# Patient Record
Sex: MALE | Race: BLACK OR AFRICAN AMERICAN | NOT HISPANIC OR LATINO | Employment: FULL TIME | URBAN - METROPOLITAN AREA
[De-identification: names, ages, dates, MRNs, and addresses within clinical notes are randomized per-mention and may not be internally consistent; named-entity substitution may affect disease eponyms.]

---

## 2017-02-03 ENCOUNTER — OFFICE VISIT (OUTPATIENT)
Dept: FAMILY MEDICINE | Facility: CLINIC | Age: 37
End: 2017-02-03
Payer: COMMERCIAL

## 2017-02-03 VITALS
OXYGEN SATURATION: 98 % | HEIGHT: 74 IN | TEMPERATURE: 98 F | DIASTOLIC BLOOD PRESSURE: 80 MMHG | HEART RATE: 90 BPM | BODY MASS INDEX: 34.57 KG/M2 | SYSTOLIC BLOOD PRESSURE: 120 MMHG | WEIGHT: 269.38 LBS

## 2017-02-03 DIAGNOSIS — E66.9 NON MORBID OBESITY, UNSPECIFIED OBESITY TYPE: ICD-10-CM

## 2017-02-03 DIAGNOSIS — Z00.00 ANNUAL PHYSICAL EXAM: Primary | ICD-10-CM

## 2017-02-03 DIAGNOSIS — K21.9 GASTROESOPHAGEAL REFLUX DISEASE, ESOPHAGITIS PRESENCE NOT SPECIFIED: ICD-10-CM

## 2017-02-03 PROCEDURE — 99214 OFFICE O/P EST MOD 30 MIN: CPT | Mod: S$GLB,,, | Performed by: FAMILY MEDICINE

## 2017-02-03 PROCEDURE — 99999 PR PBB SHADOW E&M-EST. PATIENT-LVL III: CPT | Mod: PBBFAC,,, | Performed by: FAMILY MEDICINE

## 2017-02-03 RX ORDER — ESOMEPRAZOLE MAGNESIUM 40 MG/1
40 CAPSULE, DELAYED RELEASE ORAL DAILY
Qty: 90 CAPSULE | Refills: 1 | Status: SHIPPED | OUTPATIENT
Start: 2017-02-03 | End: 2019-05-28

## 2017-02-03 NOTE — MR AVS SNAPSHOT
[facility]  [address] [place] [address]  Phone: [phone]  Fax: [phone]                  Dewey Melendez   2/3/2017 3:00 PM   Office Visit    Description:  Male : 1980   Provider:  Bettina Castro MD   Department:  [facility] Medicine           Reason for Visit     Abdominal Pain           Diagnoses this Visit        Comments    Gastroesophageal reflux disease, esophagitis presence not specified    -  Primary     Annual physical exam                To Do List           Future Appointments        Provider Department Dept Phone    2/10/2017 3:30 PM LAB, [place] [facility] [phone]      Goals (5 Years of Data)     None       These Medications        Disp Refills Start End    esomeprazole (NEXIUM) 40 MG capsule 90 capsule 1 2/3/2017 2017    Take 1 capsule (40 mg total) by mouth once daily. - Oral    Pharmacy: [facility] [address] [place], [place] [address]Y AT [facility] #: [phone]         [facility] Call     [facility] Nurse Care Line -  Assistance  Registered nurses in the [facility] provide clinical advisement, health education, appointment booking, and other advisory services.  Call for this free service at [phone].             Medications           Message regarding Medications     Verify the changes and/or additions to your medication regime listed below are the same as discussed with your clinician today.  If any of these changes or additions are incorrect, please notify your healthcare provider.             Verify that the below list of medications is an accurate representation of the medications you are currently taking.  If none reported, the list may be blank. If incorrect, please contact your healthcare provider. Carry this list with you in case of emergency.           Current Medications     aspirin (ECOTRIN) 81 MG EC tablet Take 81 mg by mouth once  "daily.      esomeprazole (NEXIUM) 40 MG capsule Take 1 capsule (40 mg total) by mouth once daily.           Clinical Reference Information           Your Vitals Were     BP Pulse Temp Height Weight SpO2    120/80 (BP Location: Right arm, Patient Position: Sitting, BP Method: Manual) 90 98 °F (36.7 °C) (Oral) 6' 2" (1.88 m) 122.2 kg (269 lb 6.4 oz) 98%    BMI                34.59 kg/m2          Blood Pressure          Most Recent Value    BP  120/80      Allergies as of 2/3/2017     No Known Allergies      Immunizations Administered on Date of Encounter - 2/3/2017     None      Orders Placed During Today's Visit     Future Labs/Procedures Expected by Expires    CBC auto differential  2/3/2017 2/3/2018    Comprehensive metabolic panel  2/3/2017 2/3/2018    H. pylori antigen, stool  2/3/2017 2/3/2018    Hemoglobin A1c  2/3/2017 2/3/2018    Lipid panel  2/3/2017 2/3/2018    TSH  2/3/2017 2/3/2018      [facility] Sign-Up     Activating your [facility] account is as easy as 1-2-3!     1) Visit [email or web address], select Sign Up Now, enter this activation code and your date of birth, then select Next.  [email or web address]  Expires: 3/20/2017  3:40 PM      2) Create a username and password to use when you visit [facility] in the future and select a security question in case you lose your password and select Next.    3) Enter your e-mail address and click Sign Up!    Additional Information  If you have questions, please e-mail [email or web address] or call [phone] to talk to our [facility] staff. Remember, [facility] is NOT to be used for urgent needs. For medical emergencies, dial 911.         Instructions      GERD (Adult)    The esophagus is a tube that carries food from the mouth to the stomach. A valve at the lower end of the esophagus prevents stomach acid from flowing upward. When this valve doesn't work properly, stomach contents may repeatedly flow back up (reflux) into the esophagus. This is called gastroesophageal " "reflux disease (GERD). GERD can irritate the esophagus. It can cause problems with swallowing or breathing. In severe cases, GERD can cause recurrent pneumonia or other serious problems.  Symptoms of reflux include burning, pressure or sharp pain in the upper abdomen or mid to lower chest. The pain can spread to the neck, back, or shoulder. There may be belching, an acid taste in the back of the throat, chronic cough, or sore throat or hoarseness. GERD symptoms often occur during the day after a big meal. They can also occur at night when lying down.   Home care  Lifestyle changes can help reduce symptoms. If needed, medicines may be prescribed. Symptoms often improve with treatment, but if treatment is stopped, the symptoms often return after a few months. So most persons with GERD will need to continue treatment.  Lifestyle changes  · Limit or avoid fatty, fried, and spicy foods, as well as coffee, chocolate, mint, and foods with high acid content such as tomatoes and citrus fruit and juices (orange, grapefruit, lemon).  · Dont eat large meals, especially at night. Frequent, smaller meals are best. Do not lie down right after eating. And dont eat anything 3 hours before going to bed.  · Avoid drinking alcohol and smoking. As much as possible, stay away from second hand smoke.  · If you are overweight, losing weight will reduce symptoms.   · Avoid wearing tight clothing around your stomach area.  · If your symptoms occur during sleep, use a foam wedge to elevate your upper body (not just your head.) Or, place 4" blocks under the head of your bed.  Medicines  If needed, medicines can help relieve the symptoms of GERD and prevent damage to the esophagus. Discuss a medicine plan with your healthcare provider. This may include one or more of the following medicines:  · Antacids to help neutralize the normal acids in your stomach.  · Acid blockers (H2 blockers) to decrease acid production.  · Acid inhibitors (PPIs) to " decrease acid production in a different way than the blockers. They may work better, but can take a little longer to take effect.  Take an antacid 30-60 minutes after eating and at bedtime, but not at the same time as an acid blocker.  Try not to take medicines such as ibuprofen and aspirin. If you are taking aspirin for your heart or other medical reasons, talk to your healthcare provider about stopping it.  Follow-up care  Follow up with your healthcare provider or as advised by our staff.  When to seek medical advice  Call your healthcare provider if any of the following occur:  · Stomach pain gets worse or moves to the lower right abdomen (appendix area)  · Chest pain appears or gets worse, or spreads to the back, neck, shoulder, or arm  · Frequent vomiting (cant keep down liquids)  · Blood in the stool or vomit (red or black in color)  · Feeling weak or dizzy  · Fever of 100.4ºF (38ºC) or higher, or as directed by your healthcare provider  Date Last Reviewed: 6/23/2015 © 2000-2016 [employer]. [address] [place] [address]. All rights reserved. This information is not intended as a substitute for professional medical care. Always follow your healthcare professional's instructions.             Language Assistance Services     ATTENTION: Language assistance services are available, free of charge. Please call [phone].      ATENCIÓN: Si habla chidi, tiene a head disposición servicios gratuitos de asistencia lingüística. Llame al [phone].     [facility] Ý: N?u b?n nói Ti?ng Vi?t, có các d?ch v? h? tr? ngôn ng? mi?n phí dành cho b?n. G?i s? [phone].         [facility] complies with applicable Federal civil rights laws and does not discriminate on the basis of race, color, national origin, age, disability, or sex.

## 2017-02-03 NOTE — PROGRESS NOTES
Routine Office Visit    Patient Name: Dewey Melendez    : 1980  MRN: [id number]    Subjective:  Dewey Munguia is a 36 y.o. male who presents today for     1. Abdominal pain - started a few weeks ago - pain is epigastric and described as a burning sensation. Pt states that pain is worse after eating and aggravated with spicy foods. Pt does have a history of reflux. He has not been taking his ppi. Symptoms last 30mins to an hour.     Review of Systems   Constitutional: Negative for chills and fever.   HENT: Negative for congestion.    Eyes: Negative for blurred vision.   Respiratory: Negative for cough.    Cardiovascular: Negative for chest pain.   Gastrointestinal: Negative for abdominal pain, constipation, diarrhea, heartburn, nausea and vomiting.   Genitourinary: Negative for dysuria.   Musculoskeletal: Negative for myalgias.   Skin: Negative for itching and rash.   Neurological: Negative for dizziness and headaches.   Psychiatric/Behavioral: Negative for depression.       Active Problem List  Patient Active Problem List   Diagnosis    Obesity    Other abnormal glucose    Family history of diabetes mellitus    Dyslipidemia with elevated low density lipoprotein cholesterol and abnormally low high density lipoprotein cholesterol    Viral URI    HDL deficiency    Leukocytosis    Family history of ischemic heart disease    Prediabetes       Past Surgical History  Past Surgical History   Procedure Laterality Date    No past surgeries         Family History  Family History   Problem Relation Age of Onset    Diabetes Mother        Social History  Social History     Social History    Marital status: Single     Spouse name: N/A    Number of children: N/A    Years of education: N/A     Occupational History    Not on file.     Social History Main Topics    Smoking status: Never Smoker    Smokeless tobacco: Never Used    Alcohol use Yes      Comment: Very seldom beer or mixed drink.    Drug use:  "No    Sexual activity: Not on file     Other Topics Concern    Not on file     Social History Narrative       Medications and Allergies  Reviewed and updated.       Physical Exam  Visit Vitals    /80 (BP Location: Right arm, Patient Position: Sitting, BP Method: Manual)    Pulse 90    Temp 98 °F (36.7 °C) (Oral)    Ht 6' 2" (1.88 m)    Wt 122.2 kg (269 lb 6.4 oz)    SpO2 98%    BMI 34.59 kg/m2     Physical Exam   Constitutional: He is oriented to person, place, and time. He appears well-developed and well-nourished.   HENT:   Head: Normocephalic and atraumatic.   Eyes: Conjunctivae and EOM are normal. Pupils are equal, round, and reactive to light.   Neck: Normal range of motion. Neck supple. No JVD present. No thyromegaly present.   Cardiovascular: Normal rate, regular rhythm and normal heart sounds.    Pulmonary/Chest: Effort normal and breath sounds normal. He has no wheezes.   Abdominal: Soft. Bowel sounds are normal. He exhibits no distension. There is no tenderness. There is no guarding.   Musculoskeletal: Normal range of motion.   Lymphadenopathy:     He has no cervical adenopathy.   Neurological: He is alert and oriented to person, place, and time.   Skin: Skin is warm and dry.   Psychiatric: He has a normal mood and affect. His behavior is normal.         Assessment/Plan:  Dewey Melendez is a 36 y.o. male who presents today for :    Annual physical exam  -     Comprehensive metabolic panel; Future; Expected date: 2/3/17  -     Lipid panel; Future; Expected date: 2/3/17  -     Hemoglobin A1c; Future; Expected date: 2/3/17  -     CBC auto differential; Future; Expected date: 2/3/17  -     TSH; Future; Expected date: 2/3/17    Gastroesophageal reflux disease, esophagitis presence not specified  -     H. pylori antigen, stool; Future; Expected date: 2/3/17  -     esomeprazole (NEXIUM) 40 MG capsule; Take 1 capsule (40 mg total) by mouth once daily.  Dispense: 90 capsule; Refill: " 1  Recommend to eat bland foods   Avoid spicy foods  Avoid laying down right away.     Non morbid obesity, unspecified obesity type  The patient is asked to make an attempt to improve diet and exercise patterns to aid in medical management of this problem.        Return if symptoms worsen or fail to improve.

## 2017-02-03 NOTE — PATIENT INSTRUCTIONS

## 2017-02-10 ENCOUNTER — LAB VISIT (OUTPATIENT)
Dept: LAB | Facility: HOSPITAL | Age: 37
End: 2017-02-10
Attending: FAMILY MEDICINE
Payer: COMMERCIAL

## 2017-02-10 DIAGNOSIS — Z00.00 ANNUAL PHYSICAL EXAM: ICD-10-CM

## 2017-02-10 LAB
ALBUMIN SERPL BCP-MCNC: 3.7 G/DL
ALP SERPL-CCNC: 64 U/L
ALT SERPL W/O P-5'-P-CCNC: 27 U/L
ANION GAP SERPL CALC-SCNC: 6 MMOL/L
AST SERPL-CCNC: 26 U/L
BASOPHILS # BLD AUTO: 0.04 K/UL
BASOPHILS NFR BLD: 0.3 %
BILIRUB SERPL-MCNC: 1.1 MG/DL
BUN SERPL-MCNC: 13 MG/DL
CALCIUM SERPL-MCNC: 9.1 MG/DL
CHLORIDE SERPL-SCNC: 106 MMOL/L
CHOLEST/HDLC SERPL: 5.5 {RATIO}
CO2 SERPL-SCNC: 27 MMOL/L
CREAT SERPL-MCNC: 1.2 MG/DL
DIFFERENTIAL METHOD: ABNORMAL
EOSINOPHIL # BLD AUTO: 0.2 K/UL
EOSINOPHIL NFR BLD: 1.8 %
ERYTHROCYTE [DISTWIDTH] IN BLOOD BY AUTOMATED COUNT: 13.5 %
EST. GFR  (AFRICAN AMERICAN): >60 ML/MIN/1.73 M^2
EST. GFR  (NON AFRICAN AMERICAN): >60 ML/MIN/1.73 M^2
GLUCOSE SERPL-MCNC: 114 MG/DL
HCT VFR BLD AUTO: 41.1 %
HDL/CHOLESTEROL RATIO: 18 %
HDLC SERPL-MCNC: 194 MG/DL
HDLC SERPL-MCNC: 35 MG/DL
HGB BLD-MCNC: 13.9 G/DL
LDLC SERPL CALC-MCNC: 109.4 MG/DL
LYMPHOCYTES # BLD AUTO: 4.1 K/UL
LYMPHOCYTES NFR BLD: 33.9 %
MCH RBC QN AUTO: 29.4 PG
MCHC RBC AUTO-ENTMCNC: 33.8 %
MCV RBC AUTO: 87 FL
MONOCYTES # BLD AUTO: 0.7 K/UL
MONOCYTES NFR BLD: 5.4 %
NEUTROPHILS # BLD AUTO: 7.1 K/UL
NEUTROPHILS NFR BLD: 58.4 %
NONHDLC SERPL-MCNC: 159 MG/DL
PLATELET # BLD AUTO: 230 K/UL
PMV BLD AUTO: 11.8 FL
POTASSIUM SERPL-SCNC: 3.9 MMOL/L
PROT SERPL-MCNC: 6.9 G/DL
RBC # BLD AUTO: 4.72 M/UL
SODIUM SERPL-SCNC: 139 MMOL/L
TRIGL SERPL-MCNC: 248 MG/DL
TSH SERPL DL<=0.005 MIU/L-ACNC: 1.51 UIU/ML
WBC # BLD AUTO: 12.21 K/UL

## 2017-02-10 PROCEDURE — 84443 ASSAY THYROID STIM HORMONE: CPT

## 2017-02-10 PROCEDURE — 83036 HEMOGLOBIN GLYCOSYLATED A1C: CPT

## 2017-02-10 PROCEDURE — 85025 COMPLETE CBC W/AUTO DIFF WBC: CPT

## 2017-02-10 PROCEDURE — 80061 LIPID PANEL: CPT

## 2017-02-10 PROCEDURE — 80053 COMPREHEN METABOLIC PANEL: CPT

## 2017-02-10 PROCEDURE — 36415 COLL VENOUS BLD VENIPUNCTURE: CPT | Mod: PO

## 2017-02-11 LAB
ESTIMATED AVG GLUCOSE: 126 MG/DL
HBA1C MFR BLD HPLC: 6 %

## 2017-02-13 ENCOUNTER — TELEPHONE (OUTPATIENT)
Dept: FAMILY MEDICINE | Facility: CLINIC | Age: 37
End: 2017-02-13

## 2017-02-13 NOTE — TELEPHONE ENCOUNTER
Patient notified of his most recent lab results and Dr. Castro's notes from 2/13/2017 and he verbalized understanding.  He asked about seeing his results online and he was given the activation code from his most recent AVS to establish a [employer] account.  Informed that his result notes would be mailed to him and advised him to contact this office for any further questions or concerns.  Verbalized understanding.

## 2017-02-13 NOTE — TELEPHONE ENCOUNTER
----- Message from Bettina Castro MD sent at 2/13/2017  3:50 PM CST -----  You have pre-diabetes. The number is stable. You do not need medication, but should start exercising and making life style modifications to decrease processed carbohydrates (rice, flour, pasta, bread)  Your triglycerides (a type of fat) were elevated. I recommend a low fat diet and regular exercise. I also recommend over the counter omega 3 fish oil daily (~1gm/day)  Normal thyroid, liver and kidney

## 2017-02-15 ENCOUNTER — PATIENT MESSAGE (OUTPATIENT)
Dept: FAMILY MEDICINE | Facility: CLINIC | Age: 37
End: 2017-02-15

## 2017-02-15 ENCOUNTER — TELEPHONE (OUTPATIENT)
Dept: FAMILY MEDICINE | Facility: CLINIC | Age: 37
End: 2017-02-15

## 2017-05-11 ENCOUNTER — OFFICE VISIT (OUTPATIENT)
Dept: FAMILY MEDICINE | Facility: CLINIC | Age: 37
End: 2017-05-11
Payer: COMMERCIAL

## 2017-05-11 VITALS
OXYGEN SATURATION: 97 % | BODY MASS INDEX: 34.03 KG/M2 | HEART RATE: 86 BPM | HEIGHT: 74 IN | SYSTOLIC BLOOD PRESSURE: 120 MMHG | DIASTOLIC BLOOD PRESSURE: 80 MMHG | WEIGHT: 265.19 LBS | TEMPERATURE: 98 F

## 2017-05-11 DIAGNOSIS — S46.912D MUSCLE STRAIN OF LEFT UPPER ARM, SUBSEQUENT ENCOUNTER: Primary | ICD-10-CM

## 2017-05-11 DIAGNOSIS — E78.5 HYPERLIPIDEMIA, UNSPECIFIED HYPERLIPIDEMIA TYPE: ICD-10-CM

## 2017-05-11 DIAGNOSIS — R73.03 PREDIABETES: ICD-10-CM

## 2017-05-11 PROCEDURE — 99214 OFFICE O/P EST MOD 30 MIN: CPT | Mod: S$GLB,,, | Performed by: FAMILY MEDICINE

## 2017-05-11 PROCEDURE — 1160F RVW MEDS BY RX/DR IN RCRD: CPT | Mod: S$GLB,,, | Performed by: FAMILY MEDICINE

## 2017-05-11 PROCEDURE — 99999 PR PBB SHADOW E&M-EST. PATIENT-LVL III: CPT | Mod: PBBFAC,,, | Performed by: FAMILY MEDICINE

## 2017-05-11 RX ORDER — IBUPROFEN 600 MG/1
600 TABLET ORAL 3 TIMES DAILY
Qty: 60 TABLET | Refills: 0 | Status: SHIPPED | OUTPATIENT
Start: 2017-05-11 | End: 2020-01-23

## 2017-05-11 RX ORDER — CYCLOBENZAPRINE HCL 5 MG
5 TABLET ORAL 3 TIMES DAILY PRN
Qty: 40 TABLET | Refills: 0 | Status: SHIPPED | OUTPATIENT
Start: 2017-05-11 | End: 2017-05-21

## 2017-05-11 NOTE — PATIENT INSTRUCTIONS
Muscle Strain in the Extremities  A muscle strain is a stretching and tearing of muscle fibers. This causes pain, especially when you move that muscle. There may also be some swelling and bruising.  Home care  · Keep the hurt area raised to reduce pain and swelling. This is especially important during the first 48 hours.  · Apply an ice pack over the injured area for 15 to 20 minutes every 3 to 6 hours. You should do this for the first 24 to 48 hours. You can make an ice pack by filling a plastic bag that seals at the top with ice cubes and then wrapping it with a thin towel. Be careful not to injure your skin with the ice treatments. Ice should never be applied directly to skin. Continue the use of ice packs for relief of pain and swelling as needed. After 48 hours, apply heat (warm shower or warm bath) for 15 to 20 minutes several times a day, or alternate ice and heat.  · You may use over-the-counter pain medicine to control pain, unless another medicine was prescribed. If you have chronic liver or kidney disease or ever had a stomach ulcer or GI bleeding, talk with your healthcare provider before using these medicines.  · For leg strains: If crutches have been recommended, dont put full weight on the hurt leg until you can do so without pain. You can return to sports when you are able to hop and run on the injured leg without pain.  Follow-up care  Follow up with your healthcare provider, or as advised.  When to seek medical advice  Call your healthcare provider right away if any of these occur:  · The toes of the injured leg become swollen, cold, blue, numb, or tingly  · Pain or swelling increases  Date Last Reviewed: 11/19/2015  © 9252-7938 [employer]. [address] [place] [address], [place] [address]. All rights reserved. This information is not intended as a substitute for professional medical care. Always follow your healthcare professional's instructions.

## 2017-05-11 NOTE — MR AVS SNAPSHOT
[facility]  [address]  [place] [address]  Phone: [phone]  Fax: [phone]                  Dewey Melendez   2017 3:00 PM   Office Visit    Description:  Male : 1980   Provider:  Bettina Castro MD   Department:  [facility]           Reason for Visit     Arm Pain                To Do List           Goals (5 Years of Data)     None       These Medications        Disp Refills Start End    cyclobenzaprine (FLEXERIL) 5 MG tablet 40 tablet 0 2017    Take 1 tablet (5 mg total) by mouth 3 (three) times daily as needed. - Oral    Pharmacy: [facility] [address] [place] [address] [place] AT [facility] Ph #: [phone]       ibuprofen (ADVIL,MOTRIN) 600 MG tablet 60 tablet 0 2017     Take 1 tablet (600 mg total) by mouth 3 (three) times daily. - Oral    Pharmacy: [facility] [address] [place] [address] [place] AT [facility] Ph #: [phone]         [facility]     [facility] Nurse Care Line -  Assistance  Unless otherwise directed by your provider, please contact [facility], our nurse care line that is available for  assistance.     Registered nurses in the [facility] provide: appointment scheduling, clinical advisement, health education, and other advisory services.  Call: [phone] (toll free)               Medications           Message regarding Medications     Verify the changes and/or additions to your medication regime listed below are the same as discussed with your clinician today.  If any of these changes or additions are incorrect, please notify your healthcare provider.        START taking these NEW medications        Refills    cyclobenzaprine (FLEXERIL) 5 MG tablet 0    Sig: Take 1 tablet (5 mg total) by mouth 3 (three) times daily as needed.    Class: Normal    Route: Oral    ibuprofen (ADVIL,MOTRIN) 600 MG tablet 0  "   Sig: Take 1 tablet (600 mg total) by mouth 3 (three) times daily.    Class: Normal    Route: Oral           Verify that the below list of medications is an accurate representation of the medications you are currently taking.  If none reported, the list may be blank. If incorrect, please contact your healthcare provider. Carry this list with you in case of emergency.           Current Medications     aspirin (ECOTRIN) 81 MG EC tablet Take 81 mg by mouth once daily.      cyclobenzaprine (FLEXERIL) 5 MG tablet Take 1 tablet (5 mg total) by mouth 3 (three) times daily as needed.    esomeprazole (NEXIUM) 40 MG capsule Take 1 capsule (40 mg total) by mouth once daily.    ibuprofen (ADVIL,MOTRIN) 600 MG tablet Take 1 tablet (600 mg total) by mouth 3 (three) times daily.           Clinical Reference Information           Your Vitals Were     BP Pulse Temp Height Weight SpO2    120/80 (BP Location: Right arm, Patient Position: Sitting, BP Method: Manual) 86 98.3 °F (36.8 °C) (Oral) 6' 2" (1.88 m) 120.3 kg (265 lb 3.4 oz) 97%    BMI                34.05 kg/m2          Blood Pressure          Most Recent Value    BP  120/80      Allergies as of 5/11/2017     No Known Allergies      Immunizations Administered on Date of Encounter - 5/11/2017     None      Instructions      Muscle Strain in the Extremities  A muscle strain is a stretching and tearing of muscle fibers. This causes pain, especially when you move that muscle. There may also be some swelling and bruising.  Home care  · Keep the hurt area raised to reduce pain and swelling. This is especially important during the first 48 hours.  · Apply an ice pack over the injured area for 15 to 20 minutes every 3 to 6 hours. You should do this for the first 24 to 48 hours. You can make an ice pack by filling a plastic bag that seals at the top with ice cubes and then wrapping it with a thin towel. Be careful not to injure your skin with the ice treatments. Ice should never be " applied directly to skin. Continue the use of ice packs for relief of pain and swelling as needed. After 48 hours, apply heat (warm shower or warm bath) for 15 to 20 minutes several times a day, or alternate ice and heat.  · You may use over-the-counter pain medicine to control pain, unless another medicine was prescribed. If you have chronic liver or kidney disease or ever had a stomach ulcer or GI bleeding, talk with your healthcare provider before using these medicines.  · For leg strains: If crutches have been recommended, dont put full weight on the hurt leg until you can do so without pain. You can return to sports when you are able to hop and run on the injured leg without pain.  Follow-up care  Follow up with your healthcare provider, or as advised.  When to seek medical advice  Call your healthcare provider right away if any of these occur:  · The toes of the injured leg become swollen, cold, blue, numb, or tingly  · Pain or swelling increases  Date Last Reviewed: 11/19/2015  © 9518-4871 [employer]. [address] [place] [address]. All rights reserved. This information is not intended as a substitute for professional medical care. Always follow your healthcare professional's instructions.             Language Assistance Services     ATTENTION: Language assistance services are available, free of charge. Please call [phone].      ATENCIÓN: Si peter murillo, tiene a head disposición servicios gratuitos de asistencia lingüística. Llame al [phone].     [facility] Ý: N?u b?n nói Ti?ng Vi?t, có các d?ch v? h? tr? ngôn ng? mi?n phí dành cho b?n. G?i s? [phone].         [facility] complies with applicable Federal civil rights laws and does not discriminate on the basis of race, color, national origin, age, disability, or sex.

## 2017-05-11 NOTE — PROGRESS NOTES
Routine Office Visit    Patient Name: Dewey Melendez    : 1980  MRN: [id number]    Subjective:  Dewey Munguia is a 36 y.o. male who presents today for     1. Left arm pain - started 2 weeks ago - pt states he was putting a tire on a car with both arms when he felt a pull in his left arm. Since then he has had pain in his left forearm and biceps. Pain is described an an aching pain that is worse in certain positions. Pain is worse with rotation of arm and with flexion. Patient has not tried any otc remedies for his pain.     Review of Systems   Constitutional: Negative for chills and fever.   HENT: Negative for congestion.    Eyes: Negative for blurred vision.   Respiratory: Negative for cough.    Cardiovascular: Negative for chest pain.   Gastrointestinal: Negative for abdominal pain, constipation, diarrhea, heartburn, nausea and vomiting.   Genitourinary: Negative for dysuria.   Musculoskeletal: Positive for joint pain. Negative for myalgias.   Skin: Negative for itching and rash.   Neurological: Negative for dizziness and headaches.   Psychiatric/Behavioral: Negative for depression.       Active Problem List  Patient Active Problem List   Diagnosis    Obesity    Family history of diabetes mellitus    Dyslipidemia with elevated low density lipoprotein cholesterol and abnormally low high density lipoprotein cholesterol    HDL deficiency    Family history of ischemic heart disease    Prediabetes       Past Surgical History  Past Surgical History:   Procedure Laterality Date    NO PAST SURGERIES         Family History  Family History   Problem Relation Age of Onset    Diabetes Mother        Social History  Social History     Social History    Marital status: Single     Spouse name: N/A    Number of children: N/A    Years of education: N/A     Occupational History    Not on file.     Social History Main Topics    Smoking status: Never Smoker    Smokeless tobacco: Never Used    Alcohol use Yes  "     Comment: Very seldom beer or mixed drink.    Drug use: No    Sexual activity: Not on file     Other Topics Concern    Not on file     Social History Narrative       Medications and Allergies  Reviewed and updated.   Current Outpatient Prescriptions   Medication Sig    aspirin (ECOTRIN) 81 MG EC tablet Take 81 mg by mouth once daily.      cyclobenzaprine (FLEXERIL) 5 MG tablet Take 1 tablet (5 mg total) by mouth 3 (three) times daily as needed.    esomeprazole (NEXIUM) 40 MG capsule Take 1 capsule (40 mg total) by mouth once daily.    ibuprofen (ADVIL,MOTRIN) 600 MG tablet Take 1 tablet (600 mg total) by mouth 3 (three) times daily.     No current facility-administered medications for this visit.        Physical Exam  /80 (BP Location: Right arm, Patient Position: Sitting, BP Method: Manual)  Pulse 86  Temp 98.3 °F (36.8 °C) (Oral)   Ht 6' 2" (1.88 m)  Wt 120.3 kg (265 lb 3.4 oz)  SpO2 97%  BMI 34.05 kg/m2  Physical Exam   Constitutional: He is oriented to person, place, and time. He appears well-developed and well-nourished.   HENT:   Head: Normocephalic and atraumatic.   Eyes: Conjunctivae and EOM are normal. Pupils are equal, round, and reactive to light.   Neck: Normal range of motion. Neck supple. No JVD present. No thyromegaly present.   Cardiovascular: Normal rate, regular rhythm and normal heart sounds.    Pulmonary/Chest: Effort normal and breath sounds normal. He has no wheezes.   Abdominal: Soft. Bowel sounds are normal. He exhibits no distension. There is no tenderness. There is no guarding.   Musculoskeletal: Normal range of motion.        Left upper arm: He exhibits tenderness. He exhibits no swelling, no edema, no deformity and no laceration.        Left forearm: He exhibits tenderness. He exhibits no swelling, no edema, no deformity and no laceration.   Lymphadenopathy:     He has no cervical adenopathy.   Neurological: He is alert and oriented to person, place, and time. "   Skin: Skin is warm and dry.   Psychiatric: He has a normal mood and affect. His behavior is normal.         Assessment/Plan:  Dewey Melendez is a 36 y.o. male who presents today for :    Muscle strain of left upper arm, subsequent encounter  -     cyclobenzaprine (FLEXERIL) 5 MG tablet; Take 1 tablet (5 mg total) by mouth 3 (three) times daily as needed.  Dispense: 40 tablet; Refill: 0  -     ibuprofen (ADVIL,MOTRIN) 600 MG tablet; Take 1 tablet (600 mg total) by mouth 3 (three) times daily.  Dispense: 60 tablet; Refill: 0  Conservative treatment  RICE  Recommend using heat prn pain  Recommend stretching exercises to help ease pain  Common side effects of this medication were discussed with the patient. Questions regarding medications were discussed during this visit.     Hyperlipidemia, unspecified hyperlipidemia type / Prediabetes  Recommend lifestyle modifications and dietary changes to decrease carbohydrates and fats   Recommend regular cardio exercises         Return if symptoms worsen or fail to improve.

## 2018-07-09 ENCOUNTER — TELEPHONE (OUTPATIENT)
Dept: FAMILY MEDICINE | Facility: CLINIC | Age: 38
End: 2018-07-09

## 2018-07-09 NOTE — TELEPHONE ENCOUNTER
Patient advised he can print a copy off of mychart on [facility]'s portal. Patient verbalized his understanding.

## 2018-07-09 NOTE — TELEPHONE ENCOUNTER
----- Message from Ernestine De La Torre sent at 7/9/2018  3:23 PM CDT -----  Contact: Dewey [phone]  Patient is requesting a copy of his last stress test. He would like it mailed to his residence. Please call at your earliest convenience.

## 2018-11-02 ENCOUNTER — HOSPITAL ENCOUNTER (EMERGENCY)
Facility: HOSPITAL | Age: 38
Discharge: HOME | End: 2018-11-03
Attending: EMERGENCY MEDICINE
Payer: COMMERCIAL

## 2018-11-02 DIAGNOSIS — J32.9 SINUSITIS, UNSPECIFIED CHRONICITY, UNSPECIFIED LOCATION: Primary | ICD-10-CM

## 2018-11-02 DIAGNOSIS — R03.0 ELEVATED BLOOD PRESSURE READING: ICD-10-CM

## 2018-11-02 DIAGNOSIS — R06.02 SHORTNESS OF BREATH: ICD-10-CM

## 2018-11-02 PROCEDURE — 99285 EMERGENCY DEPT VISIT HI MDM: CPT

## 2018-11-02 PROCEDURE — 81000 URINALYSIS NONAUTO W/SCOPE: CPT

## 2018-11-03 VITALS
RESPIRATION RATE: 18 BRPM | HEIGHT: 73 IN | SYSTOLIC BLOOD PRESSURE: 158 MMHG | HEART RATE: 82 BPM | WEIGHT: 275 LBS | TEMPERATURE: 98 F | BODY MASS INDEX: 36.45 KG/M2 | OXYGEN SATURATION: 100 % | DIASTOLIC BLOOD PRESSURE: 70 MMHG

## 2018-11-03 LAB
ALBUMIN SERPL BCP-MCNC: 3.8 G/DL
ALP SERPL-CCNC: 69 U/L
ALT SERPL W/O P-5'-P-CCNC: 20 U/L
ANION GAP SERPL CALC-SCNC: 9 MMOL/L
APTT BLDCRRT: 28.1 SEC
AST SERPL-CCNC: 20 U/L
BASOPHILS # BLD AUTO: 0.03 K/UL
BASOPHILS NFR BLD: 0.3 %
BILIRUB SERPL-MCNC: 1.1 MG/DL
BILIRUB UR QL STRIP: NEGATIVE
BNP SERPL-MCNC: 15 PG/ML
BUN SERPL-MCNC: 14 MG/DL
CALCIUM SERPL-MCNC: 9.5 MG/DL
CHLORIDE SERPL-SCNC: 104 MMOL/L
CLARITY UR: CLEAR
CO2 SERPL-SCNC: 25 MMOL/L
COLOR UR: ABNORMAL
CREAT SERPL-MCNC: 1.1 MG/DL
DIFFERENTIAL METHOD: NORMAL
EOSINOPHIL # BLD AUTO: 0.3 K/UL
EOSINOPHIL NFR BLD: 2.4 %
ERYTHROCYTE [DISTWIDTH] IN BLOOD BY AUTOMATED COUNT: 13.5 %
EST. GFR  (AFRICAN AMERICAN): >60 ML/MIN/1.73 M^2
EST. GFR  (NON AFRICAN AMERICAN): >60 ML/MIN/1.73 M^2
GLUCOSE SERPL-MCNC: 103 MG/DL
GLUCOSE UR QL STRIP: NEGATIVE
HCT VFR BLD AUTO: 40.8 %
HGB BLD-MCNC: 14.1 G/DL
HGB UR QL STRIP: ABNORMAL
INR PPP: 1
KETONES UR QL STRIP: NEGATIVE
LEUKOCYTE ESTERASE UR QL STRIP: NEGATIVE
LYMPHOCYTES # BLD AUTO: 4.1 K/UL
LYMPHOCYTES NFR BLD: 36.2 %
MCH RBC QN AUTO: 29.2 PG
MCHC RBC AUTO-ENTMCNC: 34.6 G/DL
MCV RBC AUTO: 85 FL
MICROSCOPIC COMMENT: NORMAL
MONOCYTES # BLD AUTO: 0.7 K/UL
MONOCYTES NFR BLD: 6.4 %
NEUTROPHILS # BLD AUTO: 6.2 K/UL
NEUTROPHILS NFR BLD: 54.7 %
NITRITE UR QL STRIP: NEGATIVE
PH UR STRIP: 6 [PH] (ref 5–8)
PLATELET # BLD AUTO: 224 K/UL
PMV BLD AUTO: 11.1 FL
POTASSIUM SERPL-SCNC: 3.3 MMOL/L
PROT SERPL-MCNC: 6.8 G/DL
PROT UR QL STRIP: NEGATIVE
PROTHROMBIN TIME: 10.7 SEC
RBC # BLD AUTO: 4.83 M/UL
RBC #/AREA URNS HPF: 1 /HPF (ref 0–4)
SODIUM SERPL-SCNC: 138 MMOL/L
SP GR UR STRIP: 1.01 (ref 1–1.03)
TROPONIN I SERPL DL<=0.01 NG/ML-MCNC: <0.006 NG/ML
URN SPEC COLLECT METH UR: ABNORMAL
UROBILINOGEN UR STRIP-ACNC: NEGATIVE EU/DL
WBC # BLD AUTO: 11.28 K/UL

## 2018-11-03 PROCEDURE — 85610 PROTHROMBIN TIME: CPT

## 2018-11-03 PROCEDURE — 85730 THROMBOPLASTIN TIME PARTIAL: CPT

## 2018-11-03 PROCEDURE — 93005 ELECTROCARDIOGRAM TRACING: CPT

## 2018-11-03 PROCEDURE — 84484 ASSAY OF TROPONIN QUANT: CPT

## 2018-11-03 PROCEDURE — 83880 ASSAY OF NATRIURETIC PEPTIDE: CPT

## 2018-11-03 PROCEDURE — 85025 COMPLETE CBC W/AUTO DIFF WBC: CPT

## 2018-11-03 PROCEDURE — 93010 ELECTROCARDIOGRAM REPORT: CPT | Mod: ,,, | Performed by: INTERNAL MEDICINE

## 2018-11-03 PROCEDURE — 80053 COMPREHEN METABOLIC PANEL: CPT

## 2018-11-03 PROCEDURE — 25000003 PHARM REV CODE 250: Performed by: EMERGENCY MEDICINE

## 2018-11-03 RX ORDER — ACETAMINOPHEN 325 MG/1
650 TABLET ORAL
Status: COMPLETED | OUTPATIENT
Start: 2018-11-03 | End: 2018-11-03

## 2018-11-03 RX ORDER — CETIRIZINE HYDROCHLORIDE 10 MG/1
10 TABLET ORAL
Status: COMPLETED | OUTPATIENT
Start: 2018-11-03 | End: 2018-11-03

## 2018-11-03 RX ADMIN — CETIRIZINE HYDROCHLORIDE 10 MG: 10 TABLET, FILM COATED ORAL at 12:11

## 2018-11-03 RX ADMIN — ACETAMINOPHEN 650 MG: 325 TABLET ORAL at 12:11

## 2018-11-03 NOTE — ED PROVIDER NOTES
"Encounter Date: 11/2/2018    SCRIBE #1 NOTE: I, Dixiesara Farrell, am scribing for, and in the presence of,  Bong Levy MD. I have scribed the following portions of the note - Other sections scribed: HPI, ROS and PE.       History     Chief Complaint   Patient presents with    Hypertension     Pt reports his BP was elevated earlier today at the clinic (173/99) and was told to come to ED. Pt c/o pressure pain behind eyes with frontal headache. Pt not on BP meds.      CC: High Blood Pressure    HPI: This 38 y.o. Male, with a medical history of dyslipidemia with elevated low density lipoprotein cholesterol and abnormally low high density lipoprotein cholesterol, presents to the ED accompanied by his fiancee c/o high blood pressure. Pt reports that he recently had a slightly elevated blood pressure reading during a life insurance exam. He states that he was advised by the nurse to monitor his pressure, noting that he later retook it with a reading of 173/99. He notes checking his pressure again and states that he then received a reading of 181/100, causing him to become concerned. Pt notes that his blood pressure is typically "125 over 60 something". He reports that he has been experiencing an itchy throat, sinus pressure and congestion for the last few days and adds that he experienced shortness of breath while going up a set of stairs earlier today (patient states he gets this sometimes when walking up those steps). Pt also reports a headache 2x days ago, but states that the symptoms later resolved after taking 2x tablets of Aleve for treatment. He additionally reports experiencing chronic swelling to the lower left leg, noting that he has a history of DVT to the leg, for which he was advised to take Aspirin as treatment (reviewed history and actually a superficial thrombosis). Pt denies chest pain, fever, chills, appetite change and urinary or bowel issues. No recent alcohol intake. No smoking or IV drug use. No " other associated symptoms.    2011 US LLE  Impression:  1.  No sonographic evidence for left lower extremity deep venous   thrombosis.  2.  Thrombosed small superficial vein overlying the left medial malleolus   with surrounding subcutaneous edema.    Stress test 2016  EKG Conclusions:    1. The EKG portion of this study is negative for ischemia at a high workload, and peak heart rate of 173 bpm (94% of predicted).   2. Exercise capacity is average.   3. Blood pressure response to exercise was normal (Presenting BP: 135/86 Peak BP: 173/45).   4. No significant arrhythmias were present.   5. There were no symptoms of chest discomfort or significant dyspnea throughout the protocol.   6. The Duke treadmill score was 8 suggesting a low probability for future cardiovascular events.      The history is provided by the patient. No  was used.     Review of patient's allergies indicates:  No Known Allergies  Past Medical History:   Diagnosis Date    Dyslipidemia with elevated low density lipoprotein cholesterol and abnormally low high density lipoprotein cholesterol 2/24/2014     Past Surgical History:   Procedure Laterality Date    NO PAST SURGERIES       Family History   Problem Relation Age of Onset    Diabetes Mother      Social History     Tobacco Use    Smoking status: Never Smoker    Smokeless tobacco: Never Used   Substance Use Topics    Alcohol use: Yes     Comment: Very seldom beer or mixed drink.    Drug use: No     Review of Systems   Constitutional: Negative for appetite change, chills and fever.        (+) high blood pressure   HENT: Positive for congestion and sinus pressure. Negative for ear pain and rhinorrhea.         (+) itchy throat   Eyes: Negative for pain and visual disturbance.   Respiratory: Positive for shortness of breath. Negative for cough.    Cardiovascular: Positive for leg swelling (left leg). Negative for chest pain.   Gastrointestinal: Negative for abdominal pain,  diarrhea, nausea and vomiting.   Genitourinary: Negative for dysuria.   Musculoskeletal: Negative for back pain and neck pain.   Skin: Negative for rash.   Neurological: Negative for headaches.       Physical Exam     Initial Vitals   BP Pulse Resp Temp SpO2   11/02/18 2257 11/02/18 2257 11/02/18 2257 11/02/18 2257 11/02/18 2318   (!) 179/89 84 16 98.4 °F (36.9 °C) 99 %      MAP       --                Physical Exam    Nursing note and vitals reviewed.  Constitutional: Vital signs are normal. He appears well-developed and well-nourished. He is active.  Non-toxic appearance. No distress.   Patient is overweight.   HENT:   Head: Normocephalic and atraumatic.   Nose: Rhinorrhea present.   Postnasal drip present.   Eyes: EOM are normal.   Neck: Trachea normal. Neck supple.   Cardiovascular: Normal rate and regular rhythm.   Equal pulses present bilaterally.   Pulmonary/Chest: Breath sounds normal. No respiratory distress.   Abdominal: Soft. Normal appearance and bowel sounds are normal. He exhibits no distension. There is no tenderness.   Musculoskeletal: Normal range of motion. He exhibits no edema.   Neurological: He is alert.   Skin: Skin is warm, dry and intact.   Psychiatric: He has a normal mood and affect.         ED Course   Procedures  Labs Reviewed   COMPREHENSIVE METABOLIC PANEL - Abnormal; Notable for the following components:       Result Value    Potassium 3.3 (*)     Total Bilirubin 1.1 (*)     All other components within normal limits   URINALYSIS, REFLEX TO URINE CULTURE - Abnormal; Notable for the following components:    Occult Blood UA 1+ (*)     All other components within normal limits    Narrative:     Preferred Collection Type->Urine, Clean Catch   B-TYPE NATRIURETIC PEPTIDE   CBC W/ AUTO DIFFERENTIAL   TROPONIN I   APTT   PROTIME-INR   URINALYSIS MICROSCOPIC    Narrative:     Preferred Collection Type->Urine, Clean Catch     EKG Readings: (Independently Interpreted)   EKG done at 12:37 a.m.  showed normal sinus rhythm with a rate of 76.  Patient's flipped T-waves inferiorly and laterally.  No ST elevation.  Normal axis and QRS.  Compared to previous EKG in 2016 and flipped T-waves are different.       Imaging Results          X-Ray Chest AP Portable (Final result)  Result time 11/03/18 00:39:12    Final result by Niharika Vogel MD (11/03/18 00:39:12)                 Impression:      No acute intrathoracic abnormality identified on this single radiographic view of the chest.      Electronically signed by: Niharika Vogel MD  Date:    11/03/2018  Time:    00:39             Narrative:    EXAMINATION:  XR CHEST AP PORTABLE    CLINICAL HISTORY:  shortness of breath;    TECHNIQUE:  Single frontal view of the chest was performed.    COMPARISON:  01/28/2016    FINDINGS:  Monitoring leads overlie the chest.  The cardiomediastinal silhouette is within normal limits. The visualized airway is unremarkable.  The lungs appear symmetrically aerated without definite focal alveolar consolidation. No large pleural effusion or pneumothorax is appreciated.Visualized osseous structures demonstrate no acute abnormality.                                 Medical Decision Making:   Differential Diagnosis:   HTN  - patient's signs and symptoms of sinus congestion.  Will give patient a for his sinus pressure and also Zyrtec.  He is Centor score 0  - patient reported 1 episode of shortness of breath or going up stairs which he sometimes gets when he goes up the stairs.  Due to the shortness of breath on the patient I will get labs looking for end-organ damage.  He does not clinically look fluid overloaded.  He is not currently any shortness of breath and never had chest pain.  He is neurovascularly intact.  Has no urinary complaints. Due the fact that he has recent acute sinusitis this blood pressure could be elevated secondary to this or it could be a central hypertension in the patient.  Lower suspicion of hypertensive  emergency.    EKG reviewed in has new flipped T-waves.  Patient still does not have any chest pain or shortness of breath. Pending labs.    Labs showed no end-organ damage.  Patient is still not complaining of chest pain or shortness of breath.  I talked to patient in detail regarding blood pressure and that he would need to get rechecked outpatient after is sinusitis has ended to see whether not this is secondary to a sinusitis or whether not this is baseline essential hypertension.  He is not fit hypertensive emergency with his blood pressure elevation and/or with his labs.  He states he understands and agrees with plan patient is to be discharged and patient will follow up his primary care. I discussed with the patient the diagnosis, treatment plan, indications for return to the emergency department, and for expected follow-up. The patient verbalized an understanding. The patient is asked if there are any questions or concerns. We discuss the case, until all issues are addressed to the patients satisfaction. Patient understands and is agreeable to the plan.   Bong Levy    Clinical Tests:   Lab Tests: Ordered and Reviewed  Radiological Study: Reviewed and Ordered  Medical Tests: Ordered and Reviewed            Scribe Attestation:   Scribe #1: I performed the above scribed service and the documentation accurately describes the services I performed. I attest to the accuracy of the note.    Attending Attestation:           Physician Attestation for Scribe:  Physician Attestation Statement for Scribe #1: I, Bong Levy MD, reviewed documentation, as scribed by Dixie Farrell in my presence, and it is both accurate and complete.                    Clinical Impression:   The primary encounter diagnosis was Sinusitis, unspecified chronicity, unspecified location. Diagnoses of Shortness of breath and Elevated blood pressure reading were also pertinent to this visit.                             Bong Levy,  MD  11/03/18 0140

## 2018-11-03 NOTE — DISCHARGE INSTRUCTIONS
Please follow up with their primary care physician for repeat blood pressure checks and evaluation.

## 2018-11-03 NOTE — ED TRIAGE NOTES
Pt presents to ED with c/o hypertension. Pt had his BP taken by a nurse and it [id number] systolic so he was told to monitor it. Pt retook it later that day with an at home blood pressure machine and it was 176/99. Pt has family history of htn but denies any previous diagnosis or medication for himself. Pt also c/o headaches that occur behind his eyes these last few weeks. Denies having a headache currently.

## 2018-11-06 ENCOUNTER — OFFICE VISIT (OUTPATIENT)
Dept: FAMILY MEDICINE | Facility: CLINIC | Age: 38
End: 2018-11-06
Payer: COMMERCIAL

## 2018-11-06 ENCOUNTER — LAB VISIT (OUTPATIENT)
Dept: LAB | Facility: HOSPITAL | Age: 38
End: 2018-11-06
Attending: INTERNAL MEDICINE
Payer: COMMERCIAL

## 2018-11-06 VITALS
OXYGEN SATURATION: 98 % | DIASTOLIC BLOOD PRESSURE: 85 MMHG | WEIGHT: 272.63 LBS | SYSTOLIC BLOOD PRESSURE: 130 MMHG | HEART RATE: 88 BPM | BODY MASS INDEX: 36.13 KG/M2 | HEIGHT: 73 IN | TEMPERATURE: 98 F

## 2018-11-06 DIAGNOSIS — J01.90 ACUTE RHINOSINUSITIS: Primary | ICD-10-CM

## 2018-11-06 DIAGNOSIS — R73.03 PREDIABETES: ICD-10-CM

## 2018-11-06 DIAGNOSIS — E78.5 DYSLIPIDEMIA WITH ELEVATED LOW DENSITY LIPOPROTEIN CHOLESTEROL AND ABNORMALLY LOW HIGH DENSITY LIPOPROTEIN CHOLESTEROL: ICD-10-CM

## 2018-11-06 DIAGNOSIS — J30.9 ALLERGIC RHINITIS, UNSPECIFIED SEASONALITY, UNSPECIFIED TRIGGER: ICD-10-CM

## 2018-11-06 LAB
CHOLEST SERPL-MCNC: 185 MG/DL
CHOLEST/HDLC SERPL: 5.6 {RATIO}
ESTIMATED AVG GLUCOSE: 120 MG/DL
HBA1C MFR BLD HPLC: 5.8 %
HDLC SERPL-MCNC: 33 MG/DL
HDLC SERPL: 17.8 %
LDLC SERPL CALC-MCNC: 127.4 MG/DL
NONHDLC SERPL-MCNC: 152 MG/DL
TRIGL SERPL-MCNC: 123 MG/DL

## 2018-11-06 PROCEDURE — 36415 COLL VENOUS BLD VENIPUNCTURE: CPT | Mod: PO

## 2018-11-06 PROCEDURE — 3008F BODY MASS INDEX DOCD: CPT | Mod: CPTII,S$GLB,, | Performed by: INTERNAL MEDICINE

## 2018-11-06 PROCEDURE — 99999 PR PBB SHADOW E&M-EST. PATIENT-LVL III: CPT | Mod: PBBFAC,,, | Performed by: INTERNAL MEDICINE

## 2018-11-06 PROCEDURE — 99214 OFFICE O/P EST MOD 30 MIN: CPT | Mod: S$GLB,,, | Performed by: INTERNAL MEDICINE

## 2018-11-06 PROCEDURE — 83036 HEMOGLOBIN GLYCOSYLATED A1C: CPT

## 2018-11-06 PROCEDURE — 80061 LIPID PANEL: CPT

## 2018-11-06 RX ORDER — PREDNISONE 20 MG/1
40 TABLET ORAL DAILY
Qty: 10 TABLET | Refills: 0 | Status: SHIPPED | OUTPATIENT
Start: 2018-11-06 | End: 2018-11-11

## 2018-11-06 NOTE — LETTER
November 6, 2018      [facility]  [address] [facility]  [place] [address]  Phone: [phone]  Fax: [phone]       Patient: Dewey Melendez   YOB: 1980  Date of Visit: 11/06/2018    To Whom It May Concern:    Crow Melendez  was at [facility] on 11/06/2018. He is excused from work due to illness. If you have any questions or concerns, or if I can be of further assistance, please do not hesitate to contact me.    Sincerely,    Sergey Graham MD

## 2018-11-06 NOTE — PROGRESS NOTES
Subjective:       Chief Complaint  Chief Complaint   Patient presents with    Sinusitis       HPI  Dewey Melendez is a 38 y.o. male with multiple medical diagnoses as listed in the medical history and problem list that presents for ED follow-up.     Sinus problem - worsening sinus congestion since last week Friday  Congestion accompanied by sinus pressure/discomfort - couldn't get much sleep   Took 2 Aleve pills overnight  Took [place] cold medicine, [place], and Advil Cold & Sinus medications  Some subjective fevers    Elevated BP  Had a life insurance exam by a nurse last Friday  Patient was seen at outside clinic on 11/2 and told to go to the ED for BP eval (was elevated to 173/99)    DM - mother and brother, paternal uncle  HTN - mother and brother      PAST MEDICAL HISTORY:  Past Medical History:   Diagnosis Date    Dyslipidemia with elevated low density lipoprotein cholesterol and abnormally low high density lipoprotein cholesterol 2/24/2014       PAST SURGICAL HISTORY:  Past Surgical History:   Procedure Laterality Date    NO PAST SURGERIES         SOCIAL HISTORY:  Social History     Socioeconomic History    Marital status: Single     Spouse name: Not on file    Number of children: Not on file    Years of education: Not on file    Highest education level: Not on file   Social Needs    Financial resource strain: Not on file    Food insecurity - worry: Not on file    Food insecurity - inability: Not on file    Transportation needs - medical: Not on file    Transportation needs - non-medical: Not on file   Occupational History    Not on file   Tobacco Use    Smoking status: Never Smoker    Smokeless tobacco: Never Used   Substance and Sexual Activity    Alcohol use: Yes     Comment: Very seldom beer or mixed drink.    Drug use: No    Sexual activity: Not on file   Other Topics Concern    Not on file   Social History Narrative    Not on file       FAMILY HISTORY:  Family History  "  Problem Relation Age of Onset    Diabetes Mother        ALLERGIES AND MEDICATIONS: updated and reviewed.  Review of patient's allergies indicates:  No Known Allergies  Current Outpatient Medications   Medication Sig Dispense Refill    aspirin (ECOTRIN) 81 MG EC tablet Take 81 mg by mouth once daily.        esomeprazole (NEXIUM) 40 MG capsule Take 1 capsule (40 mg total) by mouth once daily. 90 capsule 1    ibuprofen (ADVIL,MOTRIN) 600 MG tablet Take 1 tablet (600 mg total) by mouth 3 (three) times daily. 60 tablet 0    predniSONE (DELTASONE) 20 MG tablet Take 2 tablets (40 mg total) by mouth once daily. for 5 days 10 tablet 0     No current facility-administered medications for this visit.          ROS  Review of Systems   Constitutional: Positive for fever.   HENT: Positive for congestion, postnasal drip and sinus pressure.    Respiratory: Positive for cough.    Cardiovascular: Negative for chest pain.         Objective:       Physical Exam  Vitals:    11/06/18 1120   BP: 130/85   Pulse: 88   Temp: 98.3 °F (36.8 °C)   TempSrc: Oral   SpO2: 98%   Weight: 123.7 kg (272 lb 9.6 oz)   Height: 6' 1" (1.854 m)    Body mass index is 35.97 kg/m².  Weight: 123.7 kg (272 lb 9.6 oz)   Height: 6' 1" (185.4 cm)   Physical Exam   Constitutional: He appears well-developed and well-nourished.   HENT:   Mouth/Throat: Oropharynx is clear and moist. No oropharyngeal exudate.   Nasal turbinate hypertrophy/swelling   Cardiovascular: Normal rate.   Pulmonary/Chest: Effort normal and breath sounds normal.   Neurological: He is alert.   Skin: Skin is warm and dry. No rash noted.   Vitals reviewed.      Stress test 2016  EKG Conclusions:     1. The EKG portion of this study is negative for ischemia at a high workload, and peak heart rate of 173 bpm (94% of predicted).   2. Exercise capacity is average.   3. Blood pressure response to exercise was normal (Presenting BP: 135/86 Peak BP: 173/45).   4. No significant arrhythmias were " present.   5. There were no symptoms of chest discomfort or significant dyspnea throughout the protocol.   6. The Duke treadmill score was 8 suggesting a low probability for future cardiovascular events.    Assessment:  aaA:     1. Acute rhinosinusitis    2. Allergic rhinitis, unspecified seasonality, unspecified trigger    3. Dyslipidemia with elevated low density lipoprotein cholesterol and abnormally low high density lipoprotein cholesterol    4. Prediabetes      Plan:     Dewey Munguia was seen today for sinusitis.    Diagnoses and all orders for this visit:    Acute rhinosinusitis  Allergic rhinitis, unspecified seasonality, unspecified trigger  Discussed symptomatology of acute bacterial rhinosinusitis, including risk factors and proposed benefit, side effects/risks of steroid therapy   -     predniSONE (DELTASONE) 20 MG tablet; Take 2 tablets (40 mg total) by mouth once daily. for 5 days    Dyslipidemia with elevated low density lipoprotein cholesterol and abnormally low high density lipoprotein cholesterol  Lipids reviewed from 2017 -  Needs re-evaluation  -     Lipid panel; Future    Prediabetes  Discussed elevated A1c  Noted previously - needs re-evaluation  -     Hemoglobin A1c; Future        Health Maintenance       Date Due Completion Date    TETANUS VACCINE 06/19/1997 ---    Influenza Vaccine 08/01/2018 ---    Lipid Panel 02/10/2022 2/10/2017          Follow-up in about 4 weeks (around 12/4/2018) for f/u BP .    The patient expressed understanding and no barriers to adherence were identified.     1. The patient indicates understanding of these issues and agrees with the plan. Brief care plan is updated and reviewed with the patient as applicable.     2. The patient is given an After Visit Summary that lists all medications with directions, allergies, orders placed during this encounter and follow-up instructions.     3. I have reviewed the patient's medical information including past medical, family,  and social history sections including the medications and allergies.     4. We discussed the patient's current medications. I reconciled the patient's medication list and prepared and supplied needed refills.       Sergey Graham MD  Internal Medicine-Pediatrics

## 2018-11-06 NOTE — PATIENT INSTRUCTIONS
"  HOW TO USE NEILMED SINUS RINSE TO IRRIGATE/CLEAN YOUR SINUSES      Obtain a Neilmed Sinus Rinse kit. You can get the kit from your local pharmacy or [employer]'s website. [employer] offers three types of kits:   The Sinus Rinse Starter Kit includes an 8-ounce (240ml) squeeze bottle and 5 packets of premixed rinse solution.   The Sinus Rinse Complete Kit includes an 8-ounce (240 ml) squeeze bottle and 50 packets of premixed rinse solution.   The Sinus Rinse Kids Starter Kit includes a 4-ounce (120ml) squeeze bottle and 30 packets of premixed rinse solution, specially formulated for children.  Wash your hands to avoid contaminating the product. The CDC recommends that you use warm water and soap. Scrub your hands for about 20 seconds, or about the amount of time it takes to sing the "Happy Birthday" song twice.  Warm up distilled or previously boiled water until it is slightly warm. You can warm water up on the stove or in the microwave in a clean safe container. You should warm the water for 5 seconds at a time if using a microwave. It should be at body-temperature, or "lukewarm."   Do not use water that is not micro-filtered, boiled, or distilled to rinse your sinuses. Tap water may contain microorganisms that could cause illness. Fill the bottle with the designated amount of water. The correct amount of water should be 8 oz. (240 ml). Your water line should be at the dotted fill line of the bottle. If you are using a Kids Sinus Rinse kit, you will use 4 oz. (120 ml) of water  Pour the contents into the bottle and tighten the cap. Make sure you screw the cap on tightly so it doesn't fall off in the next step  Place one finger over the tip and shake the bottle gently. This will allow the saline mixture to dissolve into the waterPut the nozzle tip snugly against one of your nostrils. Keep your mouth open, because the mixture can drain from your mouth as well as the opposite nostril. This also reduces pressure on the " earsSqueeze the bottle gently to force the liquid into your nasal passages. Squeeze until the solution begins to drain from the opposite nostrilSqueeze the bottle until 1/4-to-1/2 (60-to-120 ml) is used in one nostril. You can use up to half the solution per nostril, but you should always use at least one-quarter of the solution for each. Blow your nose without pinching it completely shut. Pinching your nose entirely shut would put too much pressure on your eardrums. Then, try sniffing in the remaining solution to help clear out the nasopharyngeal area (at the back of your nasal passages).   Tilt your head to the opposite side to expel any remaining solution from your sinuses or nasal passage.   Spit out any solution that reaches the back of your throat.  Repeat the last 5 steps for the other nostril  Discard the tiny amount of solution left over. Never store leftover solution. It can breed bacteria  Disinfect the sinus rinse bottle. Rinse the cap, tube, and rinse bottle with water. Then, add a drop of dishwashing detergent to the bottle and fill it with water. Put the cap on and shake the bottle well. Squeeze the soapy water through the cap. Use a bottle brush to scrub the bottle, cap, and tube. Rinse thoroughly with clean water. Air dry the bottle and nozzle on a clean towel or glass plate

## 2019-01-10 ENCOUNTER — TELEPHONE (OUTPATIENT)
Dept: FAMILY MEDICINE | Facility: CLINIC | Age: 39
End: 2019-01-10

## 2019-01-10 NOTE — TELEPHONE ENCOUNTER
[place]oke to [employer] [facility] to [facility] [place] gave them the medical records phone number so they can fax the forms to them. We did not receive them.

## 2019-01-10 NOTE — TELEPHONE ENCOUNTER
----- Message from Melina Chapa sent at 1/10/2019 12:28 PM CST -----  Contact: Chris/ Kaitlin Park/ [phone]  [facility] has requested orders of medical records on patients and haven't heard anything back.  Their being told request has not be logged in.  Kaitlin Park would like staff to give her a call.  Thank you.

## 2019-01-30 ENCOUNTER — OFFICE VISIT (OUTPATIENT)
Dept: FAMILY MEDICINE | Facility: CLINIC | Age: 39
End: 2019-01-30
Payer: COMMERCIAL

## 2019-01-30 VITALS
SYSTOLIC BLOOD PRESSURE: 184 MMHG | TEMPERATURE: 98 F | BODY MASS INDEX: 36.99 KG/M2 | DIASTOLIC BLOOD PRESSURE: 104 MMHG | OXYGEN SATURATION: 96 % | HEIGHT: 73 IN | WEIGHT: 279.13 LBS | HEART RATE: 104 BPM

## 2019-01-30 DIAGNOSIS — J04.0 LARYNGITIS: Primary | ICD-10-CM

## 2019-01-30 DIAGNOSIS — I10 ESSENTIAL HYPERTENSION: ICD-10-CM

## 2019-01-30 DIAGNOSIS — R73.03 PREDIABETES: ICD-10-CM

## 2019-01-30 PROCEDURE — 3080F PR MOST RECENT DIASTOLIC BLOOD PRESSURE >= 90 MM HG: ICD-10-PCS | Mod: CPTII,S$GLB,, | Performed by: NURSE PRACTITIONER

## 2019-01-30 PROCEDURE — 99214 PR OFFICE/OUTPT VISIT, EST, LEVL IV, 30-39 MIN: ICD-10-PCS | Mod: S$GLB,,, | Performed by: NURSE PRACTITIONER

## 2019-01-30 PROCEDURE — 99999 PR PBB SHADOW E&M-EST. PATIENT-LVL III: ICD-10-PCS | Mod: PBBFAC,,, | Performed by: NURSE PRACTITIONER

## 2019-01-30 PROCEDURE — 3077F SYST BP >= 140 MM HG: CPT | Mod: CPTII,S$GLB,, | Performed by: NURSE PRACTITIONER

## 2019-01-30 PROCEDURE — 3077F PR MOST RECENT SYSTOLIC BLOOD PRESSURE >= 140 MM HG: ICD-10-PCS | Mod: CPTII,S$GLB,, | Performed by: NURSE PRACTITIONER

## 2019-01-30 PROCEDURE — 99214 OFFICE O/P EST MOD 30 MIN: CPT | Mod: S$GLB,,, | Performed by: NURSE PRACTITIONER

## 2019-01-30 PROCEDURE — 3080F DIAST BP >= 90 MM HG: CPT | Mod: CPTII,S$GLB,, | Performed by: NURSE PRACTITIONER

## 2019-01-30 PROCEDURE — 3008F BODY MASS INDEX DOCD: CPT | Mod: CPTII,S$GLB,, | Performed by: NURSE PRACTITIONER

## 2019-01-30 PROCEDURE — 3008F PR BODY MASS INDEX (BMI) DOCUMENTED: ICD-10-PCS | Mod: CPTII,S$GLB,, | Performed by: NURSE PRACTITIONER

## 2019-01-30 PROCEDURE — 99999 PR PBB SHADOW E&M-EST. PATIENT-LVL III: CPT | Mod: PBBFAC,,, | Performed by: NURSE PRACTITIONER

## 2019-01-30 RX ORDER — LISINOPRIL 20 MG/1
20 TABLET ORAL DAILY
Qty: 90 TABLET | Refills: 0 | Status: SHIPPED | OUTPATIENT
Start: 2019-01-30 | End: 2019-03-20 | Stop reason: SINTOL

## 2019-01-30 RX ORDER — PREDNISONE 20 MG/1
20 TABLET ORAL 2 TIMES DAILY
Qty: 10 TABLET | Refills: 0 | Status: SHIPPED | OUTPATIENT
Start: 2019-01-30 | End: 2019-02-04

## 2019-01-30 NOTE — PATIENT INSTRUCTIONS
Mucinex DM as directed  Prednisone 20 mg one twice a day for 5 days  Start Lisinopril 20 mg daily  Avoid all decongestants  Follow up with Dr. Graham to establish care as scheduled

## 2019-01-30 NOTE — PROGRESS NOTES
Subjective:       Patient ID: Dewey Melendez is a 38 y.o. male.    Chief Complaint: Sore Throat (4 days); Hoarse (4 days); and Chest Congestion (4 days)    38-year-old male presents to the clinic today with complaint of hoarseness and mild cough for the past 4 days.  He denies any fever, chills, sore throat, ear pain, wheezing, shortness of breath, abdominal pain, nausea, vomiting, or diarrhea.  He denies any headaches, dizziness, or blurred vision.  He denies any cardiac chest pain, heart palpitations, shortness breath, or swelling to lower extremities.  He has been taking [place] cold thePlus and [facility] sinus cough and cold medication.  His blood pressure today is 184/104.       Past Medical History:   Diagnosis Date    Dyslipidemia with elevated low density lipoprotein cholesterol and abnormally low high density lipoprotein cholesterol 2/24/2014     Past Surgical History:   Procedure Laterality Date    NO PAST SURGERIES        reports that  has never smoked. he has never used smokeless tobacco. He reports that he drinks alcohol. He reports that he does not use drugs.  Review of Systems   Constitutional: Negative for chills, fatigue and fever.        Very hoarse   HENT: Positive for voice change. Negative for congestion, ear discharge, ear pain, nosebleeds, postnasal drip, rhinorrhea, sinus pressure, sneezing and sore throat.    Respiratory: Positive for cough. Negative for shortness of breath and wheezing.    Cardiovascular: Negative for chest pain, palpitations and leg swelling.   Gastrointestinal: Negative for abdominal pain, constipation, diarrhea, nausea and vomiting.   Musculoskeletal: Negative for back pain, gait problem and neck pain.   Skin: Negative for color change and rash.   Neurological: Negative for dizziness, light-headedness and headaches.       Objective:      Physical Exam   Constitutional: He is oriented to person, place, and time. He appears well-developed and well-nourished.  No distress.   HENT:   Head: Normocephalic and atraumatic.   Right Ear: External ear normal.   Left Ear: External ear normal.   Nose: Nose normal.   Mouth/Throat: Oropharynx is clear and moist. No oropharyngeal exudate.   Eyes: Conjunctivae and EOM are normal. Pupils are equal, round, and reactive to light. Right eye exhibits no discharge. Left eye exhibits no discharge. No scleral icterus.   Neck: Normal range of motion. Neck supple.   Cardiovascular: Normal rate and regular rhythm. Exam reveals no gallop and no friction rub.   No murmur heard.  Pulmonary/Chest: Effort normal and breath sounds normal. No respiratory distress. He has no wheezes. He has no rales.   Some coughing noted no wheezing noted    Abdominal: Soft. Bowel sounds are normal. There is no tenderness.   Musculoskeletal: Normal range of motion. He exhibits no edema.   Lymphadenopathy:     He has no cervical adenopathy.   Neurological: He is alert and oriented to person, place, and time.   Skin: Skin is warm and dry. No rash noted. He is not diaphoretic.   Psychiatric: He has a normal mood and affect.       Assessment:       1. Laryngitis    2. Essential hypertension    3. Prediabetes        Plan:         Laryngitis  -     predniSONE (DELTASONE) 20 MG tablet; Take 1 tablet (20 mg total) by mouth 2 (two) times daily. for 5 days  Dispense: 10 tablet; Refill: 0    Essential hypertension  -     lisinopril (PRINIVIL,ZESTRIL) 20 MG tablet; Take 1 tablet (20 mg total) by mouth once daily.  Dispense: 90 tablet; Refill: 0    Prediabetes  - avoid excessive sugars and carbohydrates in diet

## 2019-01-30 NOTE — LETTER
January 30, 2019      [facility]  [address] [facility]  [place] [address]  Phone: [phone]  Fax: [phone]       Patient: Dewey Melendez   YOB: 1980  Date of Visit: 01/30/2019    To Whom It May Concern:    Crow Melendez  was at [facility] on 01/30/2019. He may return to work/school on 1/31/2019 with no restrictions. If you have any questions or concerns, or if I can be of further assistance, please do not hesitate to contact me.    Sincerely,      Torres Maldonado, ACACIA-C

## 2019-02-13 ENCOUNTER — LAB VISIT (OUTPATIENT)
Dept: LAB | Facility: HOSPITAL | Age: 39
End: 2019-02-13
Attending: INTERNAL MEDICINE
Payer: COMMERCIAL

## 2019-02-13 ENCOUNTER — OFFICE VISIT (OUTPATIENT)
Dept: FAMILY MEDICINE | Facility: CLINIC | Age: 39
End: 2019-02-13
Payer: COMMERCIAL

## 2019-02-13 VITALS
OXYGEN SATURATION: 98 % | DIASTOLIC BLOOD PRESSURE: 78 MMHG | WEIGHT: 271.81 LBS | SYSTOLIC BLOOD PRESSURE: 130 MMHG | HEART RATE: 98 BPM | TEMPERATURE: 98 F | BODY MASS INDEX: 36.02 KG/M2 | HEIGHT: 73 IN

## 2019-02-13 DIAGNOSIS — I10 ESSENTIAL HYPERTENSION: ICD-10-CM

## 2019-02-13 DIAGNOSIS — E78.5 DYSLIPIDEMIA WITH ELEVATED LOW DENSITY LIPOPROTEIN CHOLESTEROL AND ABNORMALLY LOW HIGH DENSITY LIPOPROTEIN CHOLESTEROL: ICD-10-CM

## 2019-02-13 DIAGNOSIS — Z00.00 ROUTINE ADULT HEALTH MAINTENANCE: Primary | ICD-10-CM

## 2019-02-13 DIAGNOSIS — Z23 NEED FOR TDAP VACCINATION: ICD-10-CM

## 2019-02-13 LAB
ANION GAP SERPL CALC-SCNC: 8 MMOL/L
BUN SERPL-MCNC: 16 MG/DL
CALCIUM SERPL-MCNC: 9.2 MG/DL
CHLORIDE SERPL-SCNC: 105 MMOL/L
CO2 SERPL-SCNC: 27 MMOL/L
CREAT SERPL-MCNC: 1.3 MG/DL
EST. GFR  (AFRICAN AMERICAN): >60 ML/MIN/1.73 M^2
EST. GFR  (NON AFRICAN AMERICAN): >60 ML/MIN/1.73 M^2
GLUCOSE SERPL-MCNC: 85 MG/DL
POTASSIUM SERPL-SCNC: 4.2 MMOL/L
SODIUM SERPL-SCNC: 140 MMOL/L

## 2019-02-13 PROCEDURE — 3075F PR MOST RECENT SYSTOLIC BLOOD PRESS GE 130-139MM HG: ICD-10-PCS | Mod: CPTII,S$GLB,, | Performed by: INTERNAL MEDICINE

## 2019-02-13 PROCEDURE — 90471 TDAP VACCINE GREATER THAN OR EQUAL TO 7YO IM: ICD-10-PCS | Mod: S$GLB,,, | Performed by: INTERNAL MEDICINE

## 2019-02-13 PROCEDURE — 99999 PR PBB SHADOW E&M-EST. PATIENT-LVL IV: CPT | Mod: PBBFAC,,, | Performed by: INTERNAL MEDICINE

## 2019-02-13 PROCEDURE — 90715 TDAP VACCINE GREATER THAN OR EQUAL TO 7YO IM: ICD-10-PCS | Mod: S$GLB,,, | Performed by: INTERNAL MEDICINE

## 2019-02-13 PROCEDURE — 90471 IMMUNIZATION ADMIN: CPT | Mod: S$GLB,,, | Performed by: INTERNAL MEDICINE

## 2019-02-13 PROCEDURE — 36415 COLL VENOUS BLD VENIPUNCTURE: CPT | Mod: PO

## 2019-02-13 PROCEDURE — 99999 PR PBB SHADOW E&M-EST. PATIENT-LVL IV: ICD-10-PCS | Mod: PBBFAC,,, | Performed by: INTERNAL MEDICINE

## 2019-02-13 PROCEDURE — 80048 BASIC METABOLIC PNL TOTAL CA: CPT

## 2019-02-13 PROCEDURE — 3078F PR MOST RECENT DIASTOLIC BLOOD PRESSURE < 80 MM HG: ICD-10-PCS | Mod: CPTII,S$GLB,, | Performed by: INTERNAL MEDICINE

## 2019-02-13 PROCEDURE — 99395 PREV VISIT EST AGE 18-39: CPT | Mod: 25,S$GLB,, | Performed by: INTERNAL MEDICINE

## 2019-02-13 PROCEDURE — 3075F SYST BP GE 130 - 139MM HG: CPT | Mod: CPTII,S$GLB,, | Performed by: INTERNAL MEDICINE

## 2019-02-13 PROCEDURE — 99395 PR PREVENTIVE VISIT,EST,18-39: ICD-10-PCS | Mod: 25,S$GLB,, | Performed by: INTERNAL MEDICINE

## 2019-02-13 PROCEDURE — 3078F DIAST BP <80 MM HG: CPT | Mod: CPTII,S$GLB,, | Performed by: INTERNAL MEDICINE

## 2019-02-13 PROCEDURE — 90715 TDAP VACCINE 7 YRS/> IM: CPT | Mod: S$GLB,,, | Performed by: INTERNAL MEDICINE

## 2019-02-13 NOTE — PATIENT INSTRUCTIONS
Use coricidn HBP instead of SUDAFED or other over-the-counter decongestants - safe for patients with high blood pressure

## 2019-02-13 NOTE — PROGRESS NOTES
Tdap injection administered as ordered. Tolerated well. Told to wait in lobby. Patient verbalized understanding and intent to comply.

## 2019-02-13 NOTE — PROGRESS NOTES
Subjective:       Chief Complaint  Chief Complaint   Patient presents with    Annual Exam       HPI  Dewey Melendez is a 38 y.o. male with multiple medical diagnoses as listed in the medical history and problem list that presents for annual exam.     HTN  Started antihypertensive - lisinopril   Nagging dry cough since starting medication  He did have laryngitis  Has been cutting back on fried food and junk foods  neds an eye exam    Also taking 1200 mg of fish oil/omega 3 supplements    Has had headaches recently  Improved with Aleve    Answers for HPI/ROS submitted by the patient on 2/11/2019   Hypertension  Chronicity: chronic  Onset: 1 to 4 weeks ago  Progression since onset: waxing and waning  Condition status: resistant  Agents associated with hypertension: no associated agents  CAD risks: dyslipidemia, obesity, sedentary lifestyle  Compliance problems: diet  Past treatments: nothing  Improvement on treatment: mild      Patient Care Team:  Bettina Castro MD as PCP - General (Family Medicine)      PAST MEDICAL HISTORY:  Past Medical History:   Diagnosis Date    Dyslipidemia with elevated low density lipoprotein cholesterol and abnormally low high density lipoprotein cholesterol 2/24/2014       PAST SURGICAL HISTORY:  Past Surgical History:   Procedure Laterality Date    NO PAST SURGERIES         SOCIAL HISTORY:  Social History     Socioeconomic History    Marital status: Single     Spouse name: Not on file    Number of children: Not on file    Years of education: Not on file    Highest education level: Not on file   Social Needs    Financial resource strain: Not on file    Food insecurity - worry: Not on file    Food insecurity - inability: Not on file    Transportation needs - medical: Not on file    Transportation needs - non-medical: Not on file   Occupational History    Not on file   Tobacco Use    Smoking status: Never Smoker    Smokeless tobacco: Never Used   Substance and Sexual  Activity    Alcohol use: Yes     Comment: Very seldom beer or mixed drink.    Drug use: No    Sexual activity: Not on file   Other Topics Concern    Not on file   Social History Narrative    Not on file       FAMILY HISTORY:  Family History   Problem Relation Age of Onset    Diabetes Mother        ALLERGIES AND MEDICATIONS: updated and reviewed.  Review of patient's allergies indicates:  No Known Allergies  Current Outpatient Medications   Medication Sig Dispense Refill    lisinopril (PRINIVIL,ZESTRIL) 20 MG tablet Take 1 tablet (20 mg total) by mouth once daily. 90 tablet 0    aspirin (ECOTRIN) 81 MG EC tablet Take 81 mg by mouth once daily.        esomeprazole (NEXIUM) 40 MG capsule Take 1 capsule (40 mg total) by mouth once daily. 90 capsule 1    ibuprofen (ADVIL,MOTRIN) 600 MG tablet Take 1 tablet (600 mg total) by mouth 3 (three) times daily. 60 tablet 0     No current facility-administered medications for this visit.          ROS  Review of Systems   Constitutional: Negative for appetite change, chills, diaphoresis, fever and unexpected weight change.   HENT: Negative for congestion and rhinorrhea.    Eyes: Negative for pain, redness, itching and visual disturbance.   Respiratory: Negative for cough and shortness of breath.    Cardiovascular: Positive for palpitations. Negative for chest pain.   Gastrointestinal: Negative for abdominal pain, constipation, diarrhea and nausea.   Endocrine: Negative for cold intolerance, heat intolerance, polydipsia and polyuria.   Genitourinary: Negative for decreased urine volume, difficulty urinating, dysuria and enuresis.   Musculoskeletal: Negative for arthralgias and joint swelling.   Skin: Negative for rash and wound.   Allergic/Immunologic: Negative for environmental allergies.   Neurological: Positive for headaches. Negative for dizziness, weakness, light-headedness and numbness.   Hematological: Negative for adenopathy. Does not bruise/bleed easily.  "  Psychiatric/Behavioral: Negative for dysphoric mood. The patient is not nervous/anxious.    All other systems reviewed and are negative.        Objective:       Physical Exam  Vitals:    02/13/19 1511   BP: 130/78   Pulse: 98   Temp: 98.4 °F (36.9 °C)   TempSrc: Oral   SpO2: 98%   Weight: 123.3 kg (271 lb 13.2 oz)   Height: 6' 1" (1.854 m)    Body mass index is 35.86 kg/m².  Weight: 123.3 kg (271 lb 13.2 oz)   Height: 6' 1" (185.4 cm)     Physical Exam   Constitutional: He appears well-developed and well-nourished. No distress.   HENT:   Head: Normocephalic and atraumatic.   Right Ear: External ear normal.   Left Ear: External ear normal.   Nose: Nose normal.   Mouth/Throat: Oropharynx is clear and moist.   Eyes: EOM are normal. Pupils are equal, round, and reactive to light.   Neck: Normal range of motion. Neck supple. No thyromegaly present.   Cardiovascular: Normal rate, normal heart sounds and intact distal pulses.   No murmur heard.  Pulmonary/Chest: Effort normal and breath sounds normal. No stridor. No respiratory distress.   Abdominal: Soft. Bowel sounds are normal. He exhibits no distension and no mass. There is no tenderness. There is no guarding. No hernia.   Musculoskeletal: Normal range of motion. He exhibits no edema or tenderness.   Neurological: He is alert. No cranial nerve deficit.   Skin: Skin is warm and dry. No rash noted. No erythema.   Psychiatric: He has a normal mood and affect. His behavior is normal.   Vitals reviewed.          Assessment:     1. Routine adult health maintenance    2. Essential hypertension    3. Dyslipidemia with elevated low density lipoprotein cholesterol and abnormally low high density lipoprotein cholesterol    4. Need for Tdap vaccination      Plan:     Dewey Munguia was seen today for annual exam.    Diagnoses and all orders for this visit:    Routine adult health maintenance  Discussed healthy diet, regular exercise, necessary labs, age appropriate cancer " screening, and routine vaccinations.      Essential hypertension  BP controlled presently - reviewed anti-hypertensive regimen - continue current therapy  -     Ambulatory referral to Optometry  -     Basic metabolic panel; Future    Dyslipidemia with elevated low density lipoprotein cholesterol and abnormally low high density lipoprotein cholesterol  Counseled regarding lipid elevation and diet/exercise interventions    Need for Tdap vaccination  Counseled regarding Tdap vaccination - administered  -     Tdap Vaccine          Health Maintenance       Date Due Completion Date    TETANUS VACCINE 06/19/1997 ---    Influenza Vaccine 08/01/2018 ---    Lipid Panel 11/06/2023 11/6/2018            Health Maintenance reviewed, addressed as per orders - Counseled regarding seasonal influenza vaccination - patient declined    Follow-up in about 6 months (around 8/13/2019) for HTN.    The patient expressed understanding and no barriers to adherence were identified.     1. The patient indicates understanding of these issues and agrees with the plan. Brief care plan is updated and reviewed with the patient as applicable.     2. The patient is given an After Visit Summary that lists all medications with directions, allergies, orders placed during this encounter and follow-up instructions.     3. I have reviewed the patient's medical information including past medical, family, and social history sections including the medications and allergies.     4. We discussed the patient's current medications. I reconciled the patient's medication list and prepared and supplied needed refills.       Sergey Graham MD  Internal Medicine-Pediatrics

## 2019-02-19 ENCOUNTER — TELEPHONE (OUTPATIENT)
Dept: OPTOMETRY | Facility: CLINIC | Age: 39
End: 2019-02-19

## 2019-03-12 ENCOUNTER — OFFICE VISIT (OUTPATIENT)
Dept: OPTOMETRY | Facility: CLINIC | Age: 39
End: 2019-03-12
Payer: COMMERCIAL

## 2019-03-12 DIAGNOSIS — H52.7 REFRACTIVE ERROR: ICD-10-CM

## 2019-03-12 DIAGNOSIS — I10 ESSENTIAL HYPERTENSION: Primary | ICD-10-CM

## 2019-03-12 PROCEDURE — 92004 COMPRE OPH EXAM NEW PT 1/>: CPT | Mod: S$GLB,,, | Performed by: OPTOMETRIST

## 2019-03-12 PROCEDURE — 92004 PR EYE EXAM, NEW PATIENT,COMPREHESV: ICD-10-PCS | Mod: S$GLB,,, | Performed by: OPTOMETRIST

## 2019-03-12 PROCEDURE — 99999 PR PBB SHADOW E&M-EST. PATIENT-LVL I: CPT | Mod: PBBFAC,,, | Performed by: OPTOMETRIST

## 2019-03-12 PROCEDURE — 92015 DETERMINE REFRACTIVE STATE: CPT | Mod: S$GLB,,, | Performed by: OPTOMETRIST

## 2019-03-12 PROCEDURE — 92015 PR REFRACTION: ICD-10-PCS | Mod: S$GLB,,, | Performed by: OPTOMETRIST

## 2019-03-12 PROCEDURE — 99999 PR PBB SHADOW E&M-EST. PATIENT-LVL I: ICD-10-PCS | Mod: PBBFAC,,, | Performed by: OPTOMETRIST

## 2019-03-12 NOTE — PROGRESS NOTES
Subjective:       Patient ID: Dewey Melendez is a 38 y.o. male      Chief Complaint   Patient presents with    Concerns About Ocular Health    Hypertensive Eye Exam     History of Present Illness  Dls: ?     39 y/o male presents today with c/o blurry vision at distance and near ou off/on.   Pt does not wear any glasses.     No tearing  No itching  + burning  No pain  No ha's  + floaters  No flashes    Eye meds  None    Assessment/Plan:     1. Essential hypertension  No hypertensive retinopathy. Continue BP control. RTC 1 year for DFE.    2. Refractive error  SVL near PRN. Educated patient on refractive error and discussed lens options. Dispensed updated spectacle Rx. Educated about adaptation period to new specs.    Eyeglass Final Rx     Eyeglass Final Rx       Sphere Cylinder    Right +1.00 Sphere    Left +1.00 Sphere    Type:  SVL near    Expiration Date:  3/12/2020                  Follow-up in about 1 year (around 3/12/2020).

## 2019-03-20 ENCOUNTER — PATIENT MESSAGE (OUTPATIENT)
Dept: ADMINISTRATIVE | Facility: OTHER | Age: 39
End: 2019-03-20

## 2019-03-20 ENCOUNTER — OFFICE VISIT (OUTPATIENT)
Dept: FAMILY MEDICINE | Facility: CLINIC | Age: 39
End: 2019-03-20
Payer: COMMERCIAL

## 2019-03-20 VITALS
SYSTOLIC BLOOD PRESSURE: 158 MMHG | WEIGHT: 240.31 LBS | HEART RATE: 104 BPM | OXYGEN SATURATION: 97 % | TEMPERATURE: 99 F | HEIGHT: 73 IN | DIASTOLIC BLOOD PRESSURE: 92 MMHG | BODY MASS INDEX: 31.85 KG/M2

## 2019-03-20 DIAGNOSIS — B97.89 VIRAL SINUSITIS: ICD-10-CM

## 2019-03-20 DIAGNOSIS — I10 ESSENTIAL HYPERTENSION: Primary | ICD-10-CM

## 2019-03-20 DIAGNOSIS — J32.9 VIRAL SINUSITIS: ICD-10-CM

## 2019-03-20 PROCEDURE — 99999 PR PBB SHADOW E&M-EST. PATIENT-LVL III: ICD-10-PCS | Mod: PBBFAC,,, | Performed by: NURSE PRACTITIONER

## 2019-03-20 PROCEDURE — 3077F SYST BP >= 140 MM HG: CPT | Mod: CPTII,S$GLB,, | Performed by: NURSE PRACTITIONER

## 2019-03-20 PROCEDURE — 99214 PR OFFICE/OUTPT VISIT, EST, LEVL IV, 30-39 MIN: ICD-10-PCS | Mod: S$GLB,,, | Performed by: NURSE PRACTITIONER

## 2019-03-20 PROCEDURE — 99999 PR PBB SHADOW E&M-EST. PATIENT-LVL III: CPT | Mod: PBBFAC,,, | Performed by: NURSE PRACTITIONER

## 2019-03-20 PROCEDURE — 3008F BODY MASS INDEX DOCD: CPT | Mod: CPTII,S$GLB,, | Performed by: NURSE PRACTITIONER

## 2019-03-20 PROCEDURE — 3080F DIAST BP >= 90 MM HG: CPT | Mod: CPTII,S$GLB,, | Performed by: NURSE PRACTITIONER

## 2019-03-20 PROCEDURE — 99214 OFFICE O/P EST MOD 30 MIN: CPT | Mod: S$GLB,,, | Performed by: NURSE PRACTITIONER

## 2019-03-20 PROCEDURE — 3080F PR MOST RECENT DIASTOLIC BLOOD PRESSURE >= 90 MM HG: ICD-10-PCS | Mod: CPTII,S$GLB,, | Performed by: NURSE PRACTITIONER

## 2019-03-20 PROCEDURE — 3008F PR BODY MASS INDEX (BMI) DOCUMENTED: ICD-10-PCS | Mod: CPTII,S$GLB,, | Performed by: NURSE PRACTITIONER

## 2019-03-20 PROCEDURE — 3077F PR MOST RECENT SYSTOLIC BLOOD PRESSURE >= 140 MM HG: ICD-10-PCS | Mod: CPTII,S$GLB,, | Performed by: NURSE PRACTITIONER

## 2019-03-20 RX ORDER — AMLODIPINE BESYLATE 5 MG/1
5 TABLET ORAL DAILY
Qty: 90 TABLET | Refills: 0 | Status: SHIPPED | OUTPATIENT
Start: 2019-03-20 | End: 2019-06-06

## 2019-03-20 RX ORDER — BENZONATATE 200 MG/1
200 CAPSULE ORAL 3 TIMES DAILY PRN
Qty: 30 CAPSULE | Refills: 0 | Status: SHIPPED | OUTPATIENT
Start: 2019-03-20 | End: 2019-03-30

## 2019-03-20 RX ORDER — FLUTICASONE PROPIONATE 50 MCG
2 SPRAY, SUSPENSION (ML) NASAL DAILY
Qty: 16 G | Refills: 0 | Status: SHIPPED | OUTPATIENT
Start: 2019-03-20 | End: 2019-11-01 | Stop reason: SDUPTHER

## 2019-03-20 NOTE — PATIENT INSTRUCTIONS
"1. Chloraseptic spray  2. Honey  3. Tessalon perles as needed 3x a day for cough  4. Mucinex can be helpful for congestion (avoid DM)  5. Flonase, 2 sprays once daily  6. Sinus rinse    Use coricidn HBP instead of SUDAFED or other over-the-counter decongestants - safe for patients with high blood pressure        HOW TO USE NEILMED SINUS RINSE TO IRRIGATE/CLEAN YOUR SINUSES      Obtain a Neilmed Sinus Rinse kit. You can get the kit from your local pharmacy or [employer]'s website. [employer] offers three types of kits:   The Sinus Rinse Starter Kit includes an 8-ounce (240ml) squeeze bottle and 5 packets of premixed rinse solution.   The Sinus Rinse Complete Kit includes an 8-ounce (240 ml) squeeze bottle and 50 packets of premixed rinse solution.   The Sinus Rinse Kids Starter Kit includes a 4-ounce (120ml) squeeze bottle and 30 packets of premixed rinse solution, specially formulated for children.  Wash your hands to avoid contaminating the product. The CDC recommends that you use warm water and soap. Scrub your hands for about 20 seconds, or about the amount of time it takes to sing the "Happy Birthday" song twice.  Warm up distilled or previously boiled water until it is slightly warm. You can warm water up on the stove or in the microwave in a clean safe container. You should warm the water for 5 seconds at a time if using a microwave. It should be at body-temperature, or "lukewarm."   Do not use water that is not micro-filtered, boiled, or distilled to rinse your sinuses. Tap water may contain microorganisms that could cause illness. Fill the bottle with the designated amount of water. The correct amount of water should be 8 oz. (240 ml). Your water line should be at the dotted fill line of the bottle. If you are using a Kids Sinus Rinse kit, you will use 4 oz. (120 ml) of water  Pour the contents into the bottle and tighten the cap. Make sure you screw the cap on tightly so it doesn't fall off in the next " step  Place one finger over the tip and shake the bottle gently. This will allow the saline mixture to dissolve into the waterPut the nozzle tip snugly against one of your nostrils. Keep your mouth open, because the mixture can drain from your mouth as well as the opposite nostril. This also reduces pressure on the earsSqueeze the bottle gently to force the liquid into your nasal passages. Squeeze until the solution begins to drain from the opposite nostrilSqueeze the bottle until 1/4-to-1/2 (60-to-120 ml) is used in one nostril. You can use up to half the solution per nostril, but you should always use at least one-quarter of the solution for each. Blow your nose without pinching it completely shut. Pinching your nose entirely shut would put too much pressure on your eardrums. Then, try sniffing in the remaining solution to help clear out the nasopharyngeal area (at the back of your nasal passages).   Tilt your head to the opposite side to expel any remaining solution from your sinuses or nasal passage.   Spit out any solution that reaches the back of your throat.  Repeat the last 5 steps for the other nostril  Discard the tiny amount of solution left over. Never store leftover solution. It can breed bacteria  Disinfect the sinus rinse bottle. Rinse the cap, tube, and rinse bottle with water. Then, add a drop of dishwashing detergent to the bottle and fill it with water. Put the cap on and shake the bottle well. Squeeze the soapy water through the cap. Use a bottle brush to scrub the bottle, cap, and tube. Rinse thoroughly with clean water. Air dry the bottle and nozzle on a clean towel or glass plate

## 2019-03-20 NOTE — PROGRESS NOTES
Subjective:       Patient ID: Dewey Melendez is a 38 y.o. male.    Chief Complaint: Cough; Fever; and Nasal Congestion    Chief Complaint   Patient presents with    Cough    Fever    Nasal Congestion       HPI  Dewey Melendez is a 38 y.o. male with multiple medical diagnoses as listed in the medical history and problem list that presents for cough, nasal congestion fever for 4 days.  This patient is new to me. Denies ear pain or discharge, nausea, vomiting, diarrhea. He has tried advil. He states that his BP is occasionally elevated - he has been taking his lisinopril since it was prescribed 1/30/2019, but has noticed that he has had a dry cough since he started it.       PAST MEDICAL HISTORY:  Past Medical History:   Diagnosis Date    Dyslipidemia with elevated low density lipoprotein cholesterol and abnormally low high density lipoprotein cholesterol 2/24/2014    Hypertension        PAST SURGICAL HISTORY:  Past Surgical History:   Procedure Laterality Date    NO PAST SURGERIES         SOCIAL HISTORY:  Social History     Socioeconomic History    Marital status: Single     Spouse name: Not on file    Number of children: Not on file    Years of education: Not on file    Highest education level: Not on file   Social Needs    Financial resource strain: Not on file    Food insecurity - worry: Not on file    Food insecurity - inability: Not on file    Transportation needs - medical: Not on file    Transportation needs - non-medical: Not on file   Occupational History    Not on file   Tobacco Use    Smoking status: Never Smoker    Smokeless tobacco: Never Used   Substance and Sexual Activity    Alcohol use: Yes     Comment: Very seldom beer or mixed drink.    Drug use: No    Sexual activity: Not on file   Other Topics Concern    Not on file   Social History Narrative    Not on file       FAMILY HISTORY:  Family History   Problem Relation Age of Onset    Diabetes Mother     Cataracts  "Mother     Blindness Maternal Grandmother     Cataracts Maternal Grandmother     Glaucoma Maternal Grandmother        ALLERGIES AND MEDICATIONS: updated and reviewed.  Review of patient's allergies indicates:  No Known Allergies  Current Outpatient Medications   Medication Sig Dispense Refill    aspirin (ECOTRIN) 81 MG EC tablet Take 81 mg by mouth once daily.        amLODIPine (NORVASC) 5 MG tablet Take 1 tablet (5 mg total) by mouth once daily. 90 tablet 0    benzonatate (TESSALON) 200 MG capsule Take 1 capsule (200 mg total) by mouth 3 (three) times daily as needed for Cough. 30 capsule 0    esomeprazole (NEXIUM) 40 MG capsule Take 1 capsule (40 mg total) by mouth once daily. 90 capsule 1    fluticasone (FLONASE) 50 mcg/actuation nasal spray 2 sprays (100 mcg total) by Each Nare route once daily. 16 g 0    ibuprofen (ADVIL,MOTRIN) 600 MG tablet Take 1 tablet (600 mg total) by mouth 3 (three) times daily. 60 tablet 0     No current facility-administered medications for this visit.        ROS  Review of Systems   Constitutional: Positive for fever. Negative for activity change, appetite change and chills.   HENT: Positive for congestion, postnasal drip, rhinorrhea, sinus pressure, sinus pain, sneezing and sore throat. Negative for trouble swallowing.    Respiratory: Positive for cough (nonproductive). Negative for shortness of breath and wheezing.    Cardiovascular: Negative for chest pain.   Gastrointestinal: Negative for constipation, diarrhea, nausea and vomiting.   Neurological: Negative for headaches.   Psychiatric/Behavioral: Negative for behavioral problems and confusion.       Objective:     Physical Exam  Vitals:    03/20/19 1017   BP: (!) 158/92   BP Location: Left arm   Patient Position: Sitting   BP Method: Large (Manual)   Pulse: 104   Temp: 98.6 °F (37 °C)   TempSrc: Oral   SpO2: 97%   Weight: 109 kg (240 lb 4.8 oz)   Height: 6' 1" (1.854 m)    Body mass index is 31.7 kg/m².  Weight: 109 kg " "(240 lb 4.8 oz)   Height: 6' 1" (185.4 cm)   Physical Exam   Constitutional: He appears well-developed and well-nourished.   HENT:   Head: Normocephalic and atraumatic.   Right Ear: Tympanic membrane and ear canal normal.   Left Ear: Tympanic membrane and ear canal normal.   Nose: Mucosal edema and rhinorrhea present. No nasal deformity. No epistaxis. Right sinus exhibits no maxillary sinus tenderness and no frontal sinus tenderness. Left sinus exhibits no maxillary sinus tenderness and no frontal sinus tenderness.   Mouth/Throat: Uvula is midline and mucous membranes are normal. Posterior oropharyngeal erythema present. No oropharyngeal exudate or posterior oropharyngeal edema.   Eyes: Conjunctivae and EOM are normal.   Neck: Normal range of motion. Neck supple.   Cardiovascular: Normal rate.   Pulmonary/Chest: Effort normal and breath sounds normal. No stridor. He has no wheezes. He has no rales.   Musculoskeletal: He exhibits no edema.   Lymphadenopathy:     He has no cervical adenopathy.   Neurological: He is alert. No cranial nerve deficit.   Skin: Skin is warm and dry. No rash noted.   Vitals reviewed.      Assessment:     1. Essential hypertension    2. Viral sinusitis      Plan:     Dewey Munguia was seen today for cough, fever and nasal congestion.    Diagnoses and all orders for this visit:    Essential hypertension  -     Hypertension Digital Medicine ([facility]) Enrollment Order  -     Hypertension Digital Medicine ([facility]): Assign Onboarding Questionnaires  -     amLODIPine (NORVASC) 5 MG tablet; Take 1 tablet (5 mg total) by mouth once daily.  -     Discontinue lisinopril     Viral sinusitis  -     benzonatate (TESSALON) 200 MG capsule; Take 1 capsule (200 mg total) by mouth 3 (three) times daily as needed for Cough.  -     fluticasone (FLONASE) 50 mcg/actuation nasal spray; 2 sprays (100 mcg total) by Each Nare route once daily.  -     Recommended OTC therapies including coricidin, mucinex, and sinus " rinse      Health Maintenance       Date Due Completion Date    Influenza Vaccine 08/01/2018 ---    Lipid Panel 11/06/2023 11/6/2018    TETANUS VACCINE 02/13/2029 2/13/2019          Follow-up: Follow-up in about 6 weeks (around 5/1/2019).    The patient expressed understanding and no barriers to adherence were identified.     1. The patient indicates understanding of these issues and agrees with the plan. Brief care plan is updated and reviewed with the patient as applicable.     2. The patient is given an After Visit Summary that lists all medications with directions, allergies, orders placed during this encounter and follow-up instructions.     3. I have reviewed the patient's medical information including past medical, family, and social history sections including the medications and allergies.     4. We discussed the patient's current medications. I reconciled the patient's medication list and prepared and supplied needed refills.     Mackenzie Newton, DNP, APRN, FNP-c  Family Medicine    My collaborating physicians are Dr. Todd Rivera and Dr. Sergey Graham

## 2019-03-20 NOTE — LETTER
March 20, 2019      [facility]  [address] [facility]  [place] [address]  Phone: [phone]  Fax: [phone]       Patient: Dewey Melendez   YOB: 1980  Date of Visit: 03/20/2019    To Whom It May Concern:    Crow Melendez  was at [facility] on 03/20/2019. He may return to work/school on 3/21/2019 with no restrictions. If you have any questions or concerns, or if I can be of further assistance, please do not hesitate to contact me.    Sincerely,    Mackenzie Newton, DNP

## 2019-03-21 ENCOUNTER — PATIENT OUTREACH (OUTPATIENT)
Dept: OTHER | Facility: OTHER | Age: 39
End: 2019-03-21

## 2019-03-21 NOTE — PROGRESS NOTES
1st attempt for enrollment call. Left voicemail.         Last 5 Patient Entered Readings                                      Current 30 Day Average: 162/96     Recent Readings 3/20/2019 3/20/2019    SBP (mmHg) 162 152    DBP (mmHg) 94 97    Pulse 97 96

## 2019-03-21 NOTE — LETTER
April 10, 2019     Dewey Munguia [facility]  [address]  [place] [address]       Dear Dewey Munguia,    Thank you for enrolling in [facility]s Digital [facility] Program. To participate, we ask that you submit information at least once weekly through your [facility] account and maintain regular contact with your Care Team. We have not from you in some time.     The Digital Medicine Care Team has attempted to reach you on multiple occasions to determine if you would like to continue participating in the program. While we encourage you to continue participating fully, we understand that circumstances may change.     To continue participating in the program, please contact me . If we do not hear back, you will be un-enrolled, and your physician will be notified of your decision.    We look forward to hearing from you soon.      Sincerely,     Tom GARCIA [facility]   [phone]

## 2019-03-21 NOTE — LETTER
April 11, 2019     Dewey Munguia [facility]  [address]  [place] [address]       Dear Dewey Munguia,    Welcome to [facility]! Our goal is to make care effective, proactive and convenient by using data you send us from home to better treat your chronic conditions.        My name is Ana Rodriguez, and I am your dedicated Digital Medicine clinician. As an expert in medication management, I will help ensure that the medications you are taking continue to provide the intended benefits and help you reach your goals. You can reach me directly at [phone] or by sending me a message directly through your [facility] account.      I am Melba Lynn and I will be your health . My job is to help you identify lifestyle changes to improve your disease control. We will talk about nutrition, exercise, and other ways you may be able to adjust your current habits to better your health. Additionally, we will help ensure you are completing the tests and screenings that are necessary to help manage your conditions. You can reach me directly at  or by sending me a message directly through your [facility] account.    Most importantly, YOU are at the center of this team. Together, we will work to improve your overall health and encourage you to meet your goals for a healthier lifestyle.     What we expect from YOU:  · Please take frequent home blood pressure measurements. We ask that you take at least 1 blood pressure reading per week, but more information will better help us get you know you. Be sure you rest for a few minutes before taking the reading in a quiet, comfortable place.     Be available to receive phone calls or [facility] messages, when appropriate, from your care team. Please let us know if there are any specific days or times that work best for us to reach you via phone.     Complete routine tests and screenings. Dont worry, we will help keep you on track!           What you should expect  from your Digital Medicine Care Team:   We will work with you to create a personalized plan of care and provide you with encouragement and education, including regarding lifestyle changes, that could help you manage your disease states.     We will adjust your current medications, if needed, and continue to monitor your long-term progress.     We will provide you and your physician with monthly progress reports after you have been in the program for more than 30 days.     We will send you reminders through [facility] and text messages to help ensure you do not miss any testing deadlines to help manage your disease states.    You will be able to reach us by phone or through your [facility] account by clicking our names under Care Team on the right side of the home screen.    I look forward to working with you to achieve your blood pressure goals!    We look forward to working with you to help manage your health,    Sincerely,    Your Digital Medicine Team    Please visit our websites to learn more:   · Hypertension: [email or web address]      Remember, we are not available for emergencies. If you have an emergency, please contact your doctors office directly or call [facility] on-call ([phone] or [phone]) or [phone].

## 2019-03-21 NOTE — LETTER
April 11, 2019     Dewey Munguia [facility]  [address]  [place] [address]       Dear Dewey Munguia,    Welcome to [facility]! Our goal is to make care effective, proactive and convenient by using data you send us from home to better treat your chronic conditions.        My name is Ana Rodriguez, and I am your dedicated Digital Medicine clinician. As an expert in medication management, I will help ensure that the medications you are taking continue to provide the intended benefits and help you reach your goals. You can reach me directly at [phone] or by sending me a message directly through your [facility] account.      I am Tom Lynn and I will be your health . My job is to help you identify lifestyle changes to improve your disease control. We will talk about nutrition, exercise, and other ways you may be able to adjust your current habits to better your health. Additionally, we will help ensure you are completing the tests and screenings that are necessary to help manage your conditions. You can reach me directly at  or by sending me a message directly through your [facility] account.    Most importantly, YOU are at the center of this team. Together, we will work to improve your overall health and encourage you to meet your goals for a healthier lifestyle.     What we expect from YOU:  · Please take frequent home blood pressure measurements. We ask that you take at least 1 blood pressure reading per week, but more information will better help us get you know you. Be sure you rest for a few minutes before taking the reading in a quiet, comfortable place.     Be available to receive phone calls or [facility] messages, when appropriate, from your care team. Please let us know if there are any specific days or times that work best for us to reach you via phone.     Complete routine tests and screenings. Dont worry, we will help keep you on track!           What you  should expect from your Digital Medicine Care Team:   We will work with you to create a personalized plan of care and provide you with encouragement and education, including regarding lifestyle changes, that could help you manage your disease states.     We will adjust your current medications, if needed, and continue to monitor your long-term progress.     We will provide you and your physician with monthly progress reports after you have been in the program for more than 30 days.     We will send you reminders through [facility] and text messages to help ensure you do not miss any testing deadlines to help manage your disease states.    You will be able to reach us by phone or through your [facility] account by clicking our names under Care Team on the right side of the home screen.    I look forward to working with you to achieve your blood pressure goals!    We look forward to working with you to help manage your health,    Sincerely,    Your Digital Medicine Team    Please visit our websites to learn more:   · Hypertension: [email or web address]      Remember, we are not available for emergencies. If you have an emergency, please contact your doctors office directly or call [facility] on-call ([phone] or [phone]) or [phone].

## 2019-03-23 ENCOUNTER — HOSPITAL ENCOUNTER (EMERGENCY)
Facility: HOSPITAL | Age: 39
Discharge: HOME | End: 2019-03-23
Attending: EMERGENCY MEDICINE
Payer: COMMERCIAL

## 2019-03-23 VITALS
WEIGHT: 264 LBS | SYSTOLIC BLOOD PRESSURE: 157 MMHG | RESPIRATION RATE: 20 BRPM | HEART RATE: 89 BPM | BODY MASS INDEX: 34.99 KG/M2 | TEMPERATURE: 99 F | HEIGHT: 73 IN | OXYGEN SATURATION: 97 % | DIASTOLIC BLOOD PRESSURE: 79 MMHG

## 2019-03-23 DIAGNOSIS — J32.9 SINUSITIS, UNSPECIFIED CHRONICITY, UNSPECIFIED LOCATION: Primary | ICD-10-CM

## 2019-03-23 PROCEDURE — 99284 EMERGENCY DEPT VISIT MOD MDM: CPT | Mod: ER

## 2019-03-23 RX ORDER — HYDROCODONE POLISTIREX AND CHLORPHENIRAMINE POLISTIREX 10; 8 MG/5ML; MG/5ML
5 SUSPENSION, EXTENDED RELEASE ORAL NIGHTLY PRN
Qty: 60 ML | Refills: 0 | Status: SHIPPED | OUTPATIENT
Start: 2019-03-23

## 2019-03-23 RX ORDER — AZITHROMYCIN 250 MG/1
250 TABLET, FILM COATED ORAL DAILY
Qty: 6 TABLET | Refills: 0 | Status: SHIPPED | OUTPATIENT
Start: 2019-03-23 | End: 2019-05-28 | Stop reason: ALTCHOICE

## 2019-03-23 RX ORDER — METHYLPREDNISOLONE 4 MG/1
TABLET ORAL
Qty: 1 PACKAGE | Refills: 0 | Status: SHIPPED | OUTPATIENT
Start: 2019-03-23 | End: 2019-04-13

## 2019-03-23 RX ORDER — MONTELUKAST SODIUM 10 MG/1
10 TABLET ORAL NIGHTLY
Qty: 30 TABLET | Refills: 0 | Status: SHIPPED | OUTPATIENT
Start: 2019-03-23 | End: 2019-04-22

## 2019-03-23 RX ORDER — LORATADINE 10 MG/1
10 TABLET ORAL DAILY
Qty: 60 TABLET | Refills: 0 | COMMUNITY
Start: 2019-03-23 | End: 2020-03-22

## 2019-03-24 NOTE — ED PROVIDER NOTES
Encounter Date: 3/23/2019    SCRIBE #1 NOTE: I, Paramjit Lan, am scribing for, and in the presence of,  Dr. Patten. I have scribed the following portions of the note - Other sections scribed: HPI, ROS, PE.       History     Chief Complaint   Patient presents with    Cough     pt reports cough since past Sunday and was seen at PCP on Wednesday and he was told to take Mucinex and Flonase and his BP meds were changed from Lisinopril to Amlodipine 5mg. He reports he has also been using Hydrochorocedine HBP and NyQuil HBP to help with symptoms. Cough is no better. Pt reports intermittent fevers for the last week.      38 y.o.male, with HTN, who presents to the ED with a chief complaint of intermittent subjective fever, coughing with brown/green sputum, nasal congestion, PND, and sinus pressure that began one week ago.  Has taken Flonase, Mucinex, Coricidin, and Nyquil without relief.  Denies rhinorrhea.  Denies tobacco use.     The history is provided by the patient.     Review of patient's allergies indicates:  No Known Allergies  Past Medical History:   Diagnosis Date    Dyslipidemia with elevated low density lipoprotein cholesterol and abnormally low high density lipoprotein cholesterol 2/24/2014    Hypertension      Past Surgical History:   Procedure Laterality Date    NO PAST SURGERIES       Family History   Problem Relation Age of Onset    Diabetes Mother     Cataracts Mother     Blindness Maternal Grandmother     Cataracts Maternal Grandmother     Glaucoma Maternal Grandmother      Social History     Tobacco Use    Smoking status: Never Smoker    Smokeless tobacco: Never Used   Substance Use Topics    Alcohol use: Yes     Comment: Very seldom beer or mixed drink.    Drug use: No     Review of Systems   Constitutional: Positive for fever.   HENT: Positive for congestion, postnasal drip and sinus pressure. Negative for rhinorrhea.    Respiratory: Positive for cough.    Neurological: Positive for  headaches.   All other systems reviewed and are negative.      Physical Exam     Initial Vitals [03/23/19 2116]   BP Pulse Resp Temp SpO2   (!) 163/84 97 20 98.7 °F (37.1 °C) 96 %      MAP       --         Physical Exam    Nursing note and vitals reviewed.  Constitutional: He appears well-developed and well-nourished.   HENT:   Head: Normocephalic and atraumatic.   Right Ear: External ear normal.   Left Ear: External ear normal.   Eyes: Conjunctivae are normal.   Neck: Normal range of motion and phonation normal. Neck supple.   Cardiovascular: Normal rate, regular rhythm, normal heart sounds and intact distal pulses. Exam reveals no gallop and no friction rub.    No murmur heard.  Pulmonary/Chest: Effort normal and breath sounds normal. No stridor. No respiratory distress. He has no wheezes. He has no rhonchi. He has no rales. He exhibits no tenderness.   Abdominal: Normal appearance.   Musculoskeletal: Normal range of motion. He exhibits no edema.   Neurological: He is alert and oriented to person, place, and time.   Skin: Skin is warm and dry.   Psychiatric: He has a normal mood and affect. His behavior is normal.         ED Course   Procedures  Labs Reviewed - No data to display       Imaging Results    None       Labs Reviewed       Imaging Reviewed    Imaging Results    None         Medications given in ED    Medications - No data to display    This document was produced by a scribe under my direction and in my presence. I agree with the content of the note and have made any necessary edits.     Mary Jane Patten MD         Note was created using voice recognition software. Note may have occasional typographical errors that may not have been identified and edited despite good shae initial review prior to signing.                   Scribe Attestation:   Scribe #1: I performed the above scribed service and the documentation accurately describes the services I performed. I attest to the accuracy of the  note.      Discharge Medications     Discharge Medication List as of 3/23/2019  9:53 PM      START taking these medications    Details   azithromycin (Z-MARGE) 250 MG tablet Take 1 tablet (250 mg total) by mouth once daily. Take first 2 tablets together, then 1 every day until finished., Starting Sat 3/23/2019, Normal      hydrocodone-chlorpheniramine (TUSSIONEX) 10-8 mg/5 mL suspension Take 5 mLs by mouth nightly as needed for Cough or Congestion., Starting Sat 3/23/2019, Print      loratadine (CLARITIN) 10 mg tablet Take 1 tablet (10 mg total) by mouth once daily., Starting Sat 3/23/2019, Until Sun 3/22/2020, OTC      methylPREDNISolone (MEDROL DOSEPACK) 4 mg tablet Take as directed, Normal      montelukast (SINGULAIR) 10 mg tablet Take 1 tablet (10 mg total) by mouth every evening., Starting Sat 3/23/2019, Until Mon 4/22/2019, Normal         CONTINUE these medications which have NOT CHANGED    Details   amLODIPine (NORVASC) 5 MG tablet Take 1 tablet (5 mg total) by mouth once daily., Starting Wed 3/20/2019, Until Tue 6/18/2019, Normal      aspirin (ECOTRIN) 81 MG EC tablet Take 81 mg by mouth once daily.  , Until Discontinued, Historical Med      benzonatate (TESSALON) 200 MG capsule Take 1 capsule (200 mg total) by mouth 3 (three) times daily as needed for Cough., Starting Wed 3/20/2019, Until Sat 3/30/2019, Normal      esomeprazole (NEXIUM) 40 MG capsule Take 1 capsule (40 mg total) by mouth once daily., Starting 2/3/2017, Until Fri 2/17/17, Normal      fluticasone (FLONASE) 50 mcg/actuation nasal spray 2 sprays (100 mcg total) by Each Nare route once daily., Starting Wed 3/20/2019, Normal      ibuprofen (ADVIL,MOTRIN) 600 MG tablet Take 1 tablet (600 mg total) by mouth 3 (three) times daily., Starting 5/11/2017, Until Discontinued, Normal                   Patient discharged to home in stable condition with instructions to:   1. Please take all meds as prescribed.  2. Follow-up with your primary care doctor   3.  Return precautions discussed and patient and/or family/caretaker understands to return to the emergency room for any concerns including worsening of your current symptoms, fever, chills, night sweats, worsening pain, chest pain, shortness of breath, nausea, vomiting, diarrhea, bleeding, headache, difficulty talking, visual disturbances, weakness, numbness or any other acute concerns               Clinical Impression:     1. Sinusitis, unspecified chronicity, unspecified location                                  Mary Jane Patten MD  04/05/19 6790

## 2019-03-27 NOTE — PROGRESS NOTES
Last 5 Patient Entered Readings                                      Current 30 Day Average: 159/98     Recent Readings 3/21/2019 3/20/2019 3/20/2019    SBP (mmHg) 155 162 152    DBP (mmHg) 102 94 97    Pulse 110 97 96        3/27: Called patient for initial enrollment call.  Patient reports he just got on lunch and requests call back after 4:30.  WCB later.

## 2019-03-28 NOTE — PROGRESS NOTES
Last 5 Patient Entered Readings                                      Current 30 Day Average: 159/98     Recent Readings 3/21/2019 3/20/2019 3/20/2019    SBP (mmHg) 155 162 152    DBP (mmHg) 102 94 97    Pulse 110 97 96        3/28: Called patient for initial enrollment call.  Left VM.  Sending [employer] message.

## 2019-04-03 NOTE — PROGRESS NOTES
Last 5 Patient Entered Readings                                      Current 30 Day Average: 159/98     Recent Readings 3/21/2019 3/20/2019 3/20/2019    SBP (mmHg) 155 162 152    DBP (mmHg) 102 94 97    Pulse 110 97 96        4/3: Called patient for initial enrollment call.  Left VM.  WCB in 1 week after 4:30pm.  If no answer/response will continue with NC.

## 2019-04-06 ENCOUNTER — PATIENT MESSAGE (OUTPATIENT)
Dept: ADMINISTRATIVE | Facility: OTHER | Age: 39
End: 2019-04-06

## 2019-04-11 NOTE — PROGRESS NOTES
Last 5 Patient Entered Readings                                      Current 30 Day Average: 154/97     Recent Readings 4/7/2019 4/7/2019 4/7/2019 4/7/2019 4/7/2019    SBP (mmHg) 155 147 152 138 135    DBP (mmHg) 97 98 96 95 93    Pulse 98 100 102 101 100        Digital Medicine Enrollment Call    Introduced Mr. Dewey Melendez to Digital Medicine.     Discussed program expectations and requirements.    Introduced digital medicine care team.     Reviewed proper timing, body position, and technique for taking BP readings.  Patient verbalized understanding.    Reviewed the importance of self-monitoring for digital medicine participation.     Reviewed that the Digital Medicine team is not available for emergencies and instructed the patient to call 911 or [facility] ([phone] or [phone]) if one arises.

## 2019-04-15 ENCOUNTER — PATIENT OUTREACH (OUTPATIENT)
Dept: OTHER | Facility: OTHER | Age: 39
End: 2019-04-15

## 2019-04-15 NOTE — PROGRESS NOTES
Last 5 Patient Entered Readings                                      Current 30 Day Average: 154/97     Recent Readings 4/7/2019 4/7/2019 4/7/2019 4/7/2019 4/7/2019    SBP (mmHg) 155 147 152 138 135    DBP (mmHg) 97 98 96 95 93    Pulse 98 100 102 101 100      Digital Medicine: Health  Introduction    Introduced Mr. Dewey Melendez to Digital Medicine. Discussed health  role and recommended lifestyle modifications. Reviewed importance of taking regular readings.      Lifestyle Assessment:   1. Current Dietary Habits(i.e. low sodium, food labels, dining out): patient is working on it, but doesn't have too much knowledge of it.       2. Exercise: no regular exercise, but walks regularly       3. Alcohol/Tobacco: no tobacco, social drinker.       4. Medication Adherence: has been compliant with the medicaiton regimen    Reviewed AHA/AACE recommendations:  Limit sodium intake to <2000mg/day  Recommended CHO intake, 45-65% of daily caloric intake  Perform 150 minutes of physical activity per week    Reviewed the importance of self-monitoring, medication adherence, and that the health  can be used as a resource for lifestyle modifications to help reduce or maintain a healthy lifestyle.  Reviewed that the Digital Medicine team is not available for emergencies and instructed the patient to call 911 or [facility] ([phone] or [phone]) if one arises.

## 2019-04-29 ENCOUNTER — PATIENT OUTREACH (OUTPATIENT)
Dept: OTHER | Facility: OTHER | Age: 39
End: 2019-04-29

## 2019-04-29 NOTE — PROGRESS NOTES
Last 5 Patient Entered Readings                                      Current 30 Day Average: 152/96     Recent Readings 4/7/2019 4/7/2019 4/7/2019 4/7/2019 4/7/2019    SBP (mmHg) 155 147 152 138 135    DBP (mmHg) 97 98 96 95 93    Pulse 98 100 102 101 100        Patient requests call back tomorrow at this time.

## 2019-04-30 NOTE — PROGRESS NOTES
Last 5 Patient Entered Readings                                      Current 30 Day Average: 152/96     Recent Readings 4/7/2019 4/7/2019 4/7/2019 4/7/2019 4/7/2019    SBP (mmHg) 155 147 152 138 135    DBP (mmHg) 97 98 96 95 93    Pulse 98 100 102 101 100        Digital Medicine: Health  Follow Up    Lifestyle Modifications:     1.Dietary Modifications (Sodium intake <2,000mg/day, food labels, dining out): patient reports not working on his salt intake. Doesn't eat vegetables, but pt states that he needs to work more on increasing. Sending resources about DASH diet via e-mail to discuss at next outreach.     2.Physical Activity: Wants to set a goal to walk twice a week.     3.Medication Therapy: Patient has been compliant with the medication regimen     4.Patient has the following medication side effects/concerns: n/a   (Frequency/Alleviating factors/Precipitating factors, etc.)     Follow up with Mr. Dewey Melendez completed. No further questions or concerns. Will continue to follow up to achieve health goals.

## 2019-05-07 ENCOUNTER — PATIENT OUTREACH (OUTPATIENT)
Dept: OTHER | Facility: OTHER | Age: 39
End: 2019-05-07

## 2019-05-07 NOTE — PROGRESS NOTES
Last 5 Patient Entered Readings                                      Current 30 Day Average: 155/96     Recent Readings 4/7/2019 4/7/2019 4/7/2019 4/7/2019 4/7/2019    SBP (mmHg) 155 147 152 138 135    DBP (mmHg) 97 98 96 95 93    Pulse 98 100 102 101 100        Digital Medicine: Health  Follow Up    Pt reports that he has been taking readings, but got a new phone. 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      Lifestyle Modifications:    1.Dietary Modifications (Sodium intake <2,000mg/day, food labels, dining out): Bought two Mrs. Dash seasoning to start using while cooking, has been trying to limit his added salt.     2.Physical Activity: Setting a goal of walking once a week.     3.Medication Therapy: Patient has been compliant with the medication regimen.    4.Patient has the following medication side effects/concerns: n/a   (Frequency/Alleviating factors/Precipitating factors, etc.)     Follow up with Mr. Dewey Munguia Al completed. No further questions or concerns. Will continue to follow up to achieve health goals.

## 2019-05-21 ENCOUNTER — PATIENT OUTREACH (OUTPATIENT)
Dept: OTHER | Facility: OTHER | Age: 39
End: 2019-05-21

## 2019-05-21 NOTE — PROGRESS NOTES
Last 5 Patient Entered Readings                                      Current 30 Day Average:      Recent Readings 4/7/2019 4/7/2019 4/7/2019 4/7/2019 4/7/2019    SBP (mmHg) 155 147 152 138 135    DBP (mmHg) 97 98 96 95 93    Pulse 98 100 102 101 100          Digital Medicine: Health  Follow Up    Left voicemail to follow up with Janet Dewey Ezra Melendez.  Current BP average Not Enough Data/Not Enough Data mmHg is not at goal, 130/80.

## 2019-05-27 NOTE — PROGRESS NOTES
"Last 5 Patient Entered Readings                                      Current 30 Day Average:      Recent Readings 4/7/2019 4/7/2019 4/7/2019 4/7/2019 4/7/2019    SBP (mmHg) 155 147 152 138 135    DBP (mmHg) 97 98 96 95 93    Pulse 98 100 102 101 100        Digital Medicine: Health  Follow Up    Lifestyle Modifications:    1.Dietary Modifications (Sodium intake <2,000mg/day, food labels, dining out): patient reports that he bought Mrs. Garrett and tried it but thought it tasted "funny". I explained to patient that taste buds adjust to salt and will take time to adjust to less salt in his diet. I encouraged the pt to continue to limit salt in his diet and to try other spice combinations. Patient denied needing any recipes or spice rub mixtures at this time.       2.Physical Activity: patient reported that he has been busy and has been unable to fit in a walk once a week but plans to try to start as work slows down next week. I encouraged the patient to try to walk for just 5 to 10 minutes each day and tried to brainstorm times of day with him that he could fit in a walk (like lunch). Patient stated he wanted to keep his goal of walking once a week.     3.Medication Therapy: Patient has been compliant with the medication regimen.    4. Patient reported that he went on a vacation this past weekend with his girl friend, his girl friend reported that the pt stops breathing at night and then gasps/coughs. Patient requested a referral for a sleep study to see if he qualifies for sleep apnea dx and tx. Advised pt to call back clinician (Ana Rodriguez) for referral.     Follow up with Mr. Palomo Ezra Melendez completed. No further questions or concerns. Will continue to follow up to achieve health goals.      "

## 2019-05-28 ENCOUNTER — PATIENT OUTREACH (OUTPATIENT)
Dept: OTHER | Facility: OTHER | Age: 39
End: 2019-05-28

## 2019-05-28 DIAGNOSIS — I10 ESSENTIAL HYPERTENSION: Primary | ICD-10-CM

## 2019-05-28 NOTE — PROGRESS NOTES
Last 5 Patient Entered Readings                                      Current 30 Day Average:      Recent Readings 4/7/2019 4/7/2019 4/7/2019 4/7/2019 4/7/2019    SBP (mmHg) 155 147 152 138 135    DBP (mmHg) 97 98 96 95 93    Pulse 98 100 102 101 100        Called patient to introduce him to the Hypertension Digital Medicine Program.     Mr. Jiang BP average is above goal. He has not been able to submit BP readings due to tech issues. He will try to get in touch with IT support today. He informed his health  that his wife has witnessed him snoring and waking gasping for air. Will place a referral for sleep medicine.     Reviewed patient's medications and verified allergies on file.   Hypertension Medications             amLODIPine (NORVASC) 5 MG tablet Take 1 tablet (5 mg total) by mouth once daily.        Explained that we expect him to obtain several blood pressures/week at random times of day. Also asked that the BP be taken at least 1 hour after taking BP medications.     Explained that our goal is to get his BP to consistently below 130/80mmHg.     Patient and I agreed that the patient will take his BP daily to every other day at varying times of the day.     Emailed patient link to [facility] webpage as well as my direct phone number in case he has any questions.

## 2019-05-29 NOTE — PROGRESS NOTES
Last 5 Patient Entered Readings                                      Current 30 Day Average: 154/97     Recent Readings 4/7/2019 4/7/2019 4/7/2019 4/7/2019 4/7/2019    SBP (mmHg) 155 147 152 138 135    DBP (mmHg) 97 98 96 95 93    Pulse 98 100 102 101 100        4/10: Called patient to do initial enrollment call. Left VM.  Sending NC letter.      no known precautions/limitations

## 2019-06-06 ENCOUNTER — PATIENT OUTREACH (OUTPATIENT)
Dept: OTHER | Facility: OTHER | Age: 39
End: 2019-06-06

## 2019-06-06 DIAGNOSIS — I10 ESSENTIAL HYPERTENSION: ICD-10-CM

## 2019-06-06 RX ORDER — AMLODIPINE BESYLATE 10 MG/1
10 TABLET ORAL DAILY
Qty: 30 TABLET | Refills: 6 | Status: SHIPPED | OUTPATIENT
Start: 2019-06-06 | End: 2019-08-06

## 2019-06-19 DIAGNOSIS — I10 ESSENTIAL HYPERTENSION: ICD-10-CM

## 2019-06-19 RX ORDER — AMLODIPINE BESYLATE 5 MG/1
TABLET ORAL
Qty: 90 TABLET | Refills: 0 | Status: SHIPPED | OUTPATIENT
Start: 2019-06-19 | End: 2019-06-21 | Stop reason: DRUGHIGH

## 2019-06-21 ENCOUNTER — PATIENT OUTREACH (OUTPATIENT)
Dept: OTHER | Facility: OTHER | Age: 39
End: 2019-06-21

## 2019-06-21 DIAGNOSIS — I10 ESSENTIAL HYPERTENSION: Primary | ICD-10-CM

## 2019-06-21 RX ORDER — TRIAMTERENE AND HYDROCHLOROTHIAZIDE 37.5; 25 MG/1; MG/1
1 CAPSULE ORAL EVERY MORNING
Qty: 30 CAPSULE | Refills: 3 | Status: SHIPPED | OUTPATIENT
Start: 2019-06-21 | End: 2019-10-28 | Stop reason: SDUPTHER

## 2019-06-21 NOTE — PROGRESS NOTES
Last 5 Patient Entered Readings                                      Current 30 Day Average: 149/91     Recent Readings 6/18/2019 6/17/2019 6/14/2019 6/13/2019 6/12/2019    SBP (mmHg) 141 147 137 141 141    DBP (mmHg) 92 83 84 81 85    Pulse 89 87 89 85 87        Mr. Melendez' BP remains above goal. He has not been able to schedule his sleep clinic appointment yet. Provided him with the number again today. Will add thiazide diuretic to his regimen and check BMP in 2-3 weeks.     Per 30 day average, 149/91 mmHg, patient's BP is not at goal.     Will continue to monitor regularly. Will follow up in 2-3 weeks, sooner if BP begins to trend upward or downward.    Asked patient to call or message with questions or concerns.     Current HTN regimen:  Hypertension Medications             amLODIPine (NORVASC) 10 MG tablet Take 1 tablet (10 mg total) by mouth once daily.    triamterene-hydrochlorothiazide 37.5-25 mg (DYAZIDE) 37.5-25 mg per capsule Take 1 capsule by mouth every morning.

## 2019-06-24 ENCOUNTER — PATIENT OUTREACH (OUTPATIENT)
Dept: OTHER | Facility: OTHER | Age: 39
End: 2019-06-24

## 2019-06-24 NOTE — PROGRESS NOTES
Last 5 Patient Entered Readings                                      Current 30 Day Average: 148/91     Recent Readings 6/24/2019 6/22/2019 6/18/2019 6/17/2019 6/14/2019    SBP (mmHg) 126 152 141 147 137    DBP (mmHg) 85 95 92 83 84    Pulse 94 81 89 87 89        Digital Medicine: Health  Follow Up    Lifestyle Modifications:    1.Dietary Modifications (Sodium intake <2,000mg/day, food labels, dining out): patient stated that he hasn't tried Mrs. Garrett again and has been eating out at fast food restaurants like [employer] and [employer]. Advised patient of sodium content in restaurant foods and brainstormed alternatives to this, like meal prepping or cooking similar menu items at home where he can control the amount of salt added to a meal. Patient stated he had no idea that fast food had as much sodium as it does and that he will practice more moderation in the future. Patient reported that he hasn't modified or changed his diet except to increase his veggie intake by adding salads to meals and adding vegetable toppings to foods (like tacos). Patient asked which seasonings/spices he could use, reviewed some suggestions and will follow up with salt free spice rubs and seasoning resources via e-mail.        2.Physical Activity: patient reported that he has been increasing his physical activity, patient stated he has been walking around at work a lot and has been working on house work. Patient denied wanting to change his PA related goals at this time.     3.Medication Therapy: Patient has been compliant with the medication regimen. Patient started taking new medication and has seen his numbers decrease since then.     Patient stated that he hasn't gotten a chance to call the sleep study clinic yet but plans to do so this week. Encouraged patient to call as soon as possible to set up an appt time.     Follow up with Mr. Dewey Munguia Melendez completed. No further questions or concerns. Will continue to follow up to achieve  health goals.

## 2019-07-10 ENCOUNTER — PATIENT OUTREACH (OUTPATIENT)
Dept: OTHER | Facility: OTHER | Age: 39
End: 2019-07-10

## 2019-07-10 NOTE — PROGRESS NOTES
Last 5 Patient Entered Readings                                      Current 30 Day Average: 137/86     Recent Readings 7/9/2019 7/8/2019 7/5/2019 7/3/2019 7/2/2019    SBP (mmHg) 134 136 145 139 130    DBP (mmHg) 88 89 85 92 89    Pulse 92 95 88 86 96        LVM to follow up on recent medication change.

## 2019-07-15 ENCOUNTER — PATIENT OUTREACH (OUTPATIENT)
Dept: OTHER | Facility: OTHER | Age: 39
End: 2019-07-15

## 2019-07-15 NOTE — PROGRESS NOTES
Last 5 Patient Entered Readings                                      Current 30 Day Average: 136/87     Recent Readings 7/15/2019 7/13/2019 7/9/2019 7/8/2019 7/5/2019    SBP (mmHg) 138 142 134 136 145    DBP (mmHg) 91 94 88 89 85    Pulse 91 86 92 95 88          Digital Medicine: Health  Follow Up    Left voicemail to follow up with  Dewey Ezra Melendez.  Current BP average 136/87 mmHg is not at goal, 130/80.

## 2019-07-17 NOTE — PROGRESS NOTES
Last 5 Patient Entered Readings                                      Current 30 Day Average: 136/87     Recent Readings 7/17/2019 7/16/2019 7/15/2019 7/13/2019 7/9/2019    SBP (mmHg) 132 136 138 142 134    DBP (mmHg) 89 89 91 94 88    Pulse 92 89 91 86 92        Mr. Jiang BP is trending down on triamterene/HCTZ. He has been taking it at night and finds he is waking up 2-3 times/night to urinate. Advised he take it in the morning going forward. Will check BMP Friday.    Per 30 day average, 136/87 mmHg, patient's BP is not at goal.     Will continue to monitor regularly. Will follow up in 3-4 weeks, sooner if BP begins to trend upward or downward.     Asked patient to call or message with questions or concerns.     Current HTN regimen:  Hypertension Medications             amLODIPine (NORVASC) 10 MG tablet Take 1 tablet (10 mg total) by mouth once daily.    triamterene-hydrochlorothiazide 37.5-25 mg (DYAZIDE) 37.5-25 mg per capsule Take 1 capsule by mouth every morning.

## 2019-07-19 ENCOUNTER — LAB VISIT (OUTPATIENT)
Dept: LAB | Facility: HOSPITAL | Age: 39
End: 2019-07-19
Attending: FAMILY MEDICINE
Payer: COMMERCIAL

## 2019-07-19 DIAGNOSIS — I10 ESSENTIAL HYPERTENSION: ICD-10-CM

## 2019-07-19 LAB
ANION GAP SERPL CALC-SCNC: 13 MMOL/L (ref 8–16)
BUN SERPL-MCNC: 15 MG/DL (ref 6–20)
CALCIUM SERPL-MCNC: 9.7 MG/DL (ref 8.7–10.5)
CHLORIDE SERPL-SCNC: 102 MMOL/L (ref 95–110)
CO2 SERPL-SCNC: 24 MMOL/L (ref 23–29)
CREAT SERPL-MCNC: 1.4 MG/DL (ref 0.5–1.4)
EST. GFR  (AFRICAN AMERICAN): >60 ML/MIN/1.73 M^2
EST. GFR  (NON AFRICAN AMERICAN): >60 ML/MIN/1.73 M^2
GLUCOSE SERPL-MCNC: 104 MG/DL (ref 70–110)
POTASSIUM SERPL-SCNC: 3.5 MMOL/L (ref 3.5–5.1)
SODIUM SERPL-SCNC: 139 MMOL/L (ref 136–145)

## 2019-07-19 PROCEDURE — 36415 COLL VENOUS BLD VENIPUNCTURE: CPT | Mod: PO

## 2019-07-19 PROCEDURE — 80048 BASIC METABOLIC PNL TOTAL CA: CPT

## 2019-07-22 NOTE — PROGRESS NOTES
Last 5 Patient Entered Readings                                      Current 30 Day Average: 135/88     Recent Readings 7/22/2019 7/19/2019 7/17/2019 7/16/2019 7/15/2019    SBP (mmHg) 135 132 132 136 138    DBP (mmHg) 93 86 89 89 91    Pulse 88 87 92 89 91        Digital Medicine: Health  Follow Up    Lifestyle Modifications:    1.Dietary Modifications (Sodium intake <2,000mg/day, food labels, dining out): patient stated that he has been eating saltier foods like chips, egg rolls, and fried chicken. Patient stated that he knows he is eating saltier foods than he should. Patient and I reviewed reading food labels and the recommendations for sodium intake in a day and in a serving. Offered patient resources about DASH diet and patient requested that I send them to his personal e-mail, sending today.     2.Physical Activity: patient stated that he has been getting his exercise in by staying busy around the house doing chores, remodeling projects etc. Patient denied wanting to add any physical activity into his life at this time.     3.Medication Therapy: Patient has been compliant with the medication regimen.      Follow up with Mr. Dewey Melendez completed. No further questions or concerns. Will continue to follow up to achieve health goals.

## 2019-08-06 ENCOUNTER — PATIENT OUTREACH (OUTPATIENT)
Dept: OTHER | Facility: OTHER | Age: 39
End: 2019-08-06

## 2019-08-06 DIAGNOSIS — I10 ESSENTIAL HYPERTENSION: ICD-10-CM

## 2019-08-06 RX ORDER — AMLODIPINE BESYLATE 10 MG/1
10 TABLET ORAL DAILY
Qty: 90 TABLET | Refills: 1 | Status: SHIPPED | OUTPATIENT
Start: 2019-08-06 | End: 2020-01-23 | Stop reason: SDUPTHER

## 2019-08-06 NOTE — PROGRESS NOTES
Last 5 Patient Entered Readings                                      Current 30 Day Average: 134/89     Recent Readings 8/6/2019 8/5/2019 7/22/2019 7/19/2019 7/17/2019    SBP (mmHg) 129 125 135 132 132    DBP (mmHg) 84 87 93 86 89    Pulse 87 90 88 87 92        Per 30 day average, 134/89 mmHg, patient's BP is not at goal.     Mr. Cabrera's BP average is trending down, but still above goal. He has not been checking BP because his monitor needed to be charged. He has submitted 2 recent readings that are closer to goal. Will continue current BP regimen for now.     Will continue to monitor regularly. Will follow up in 4-6 weeks, sooner if BP begins to trend upward or downward.    Asked patient to call or message with questions or concerns.     Current HTN regimen:  Hypertension Medications             amLODIPine (NORVASC) 10 MG tablet Take 1 tablet (10 mg total) by mouth once daily.    triamterene-hydrochlorothiazide 37.5-25 mg (DYAZIDE) 37.5-25 mg per capsule Take 1 capsule by mouth every morning.

## 2019-08-15 ENCOUNTER — PATIENT OUTREACH (OUTPATIENT)
Dept: OTHER | Facility: OTHER | Age: 39
End: 2019-08-15

## 2019-08-15 NOTE — PROGRESS NOTES
Last 5 Patient Entered Readings                                      Current 30 Day Average: 132/88     Recent Readings 8/15/2019 8/14/2019 8/13/2019 8/9/2019 8/8/2019    SBP (mmHg) 141 136 135 125 129    DBP (mmHg) 90 86 86 88 86    Pulse 87 85 85 82 83          Digital Medicine: Health  Follow Up    Left voicemail to follow up with Janet Dewey Ezra Melendez.  Current BP average 132/88 mmHg is not at goal, 130/80.

## 2019-08-27 NOTE — PROGRESS NOTES
Last 5 Patient Entered Readings                                      Current 30 Day Average: 130/86     Recent Readings 8/27/2019 8/26/2019 8/22/2019 8/21/2019 8/20/2019    SBP (mmHg) 122 122 130 130 134    DBP (mmHg) 78 84 84 82 86    Pulse 86 96 88 84 90          Digital Medicine: Health  Follow Up    Left voicemail to follow up with Mr. Dewey Melendez.  Current BP average 130/86 mmHg is not at goal, 130/80.

## 2019-09-04 NOTE — PROGRESS NOTES
Last 5 Patient Entered Readings                                      Current 30 Day Average: 130/85     Recent Readings 9/4/2019 8/29/2019 8/28/2019 8/27/2019 8/26/2019    SBP (mmHg) 135 130 123 122 122    DBP (mmHg) 79 88 87 78 84    Pulse 90 92 89 86 96        Digital Medicine: Health  Follow Up    Left voicemail to follow up with Mr. Dewey Melendez.  Current BP average 130/85 mmHg is not at goal, 130/80.

## 2019-09-24 NOTE — PROGRESS NOTES
Digital Medicine: Health  Follow-Up    Called to follow up with patient, his current average 127/82 is almost at goal <130/<80. Patient stated he is feeling well.               Diet:   He has the following dietary restrictions: low fat diet and low sodium dietHe cooks for self and has meals prepared by family.    Patient does the shopping for groceries.  He gets groceries from the grocery store.      He does not find cooking difficult.      Barriers to a Healthy Diet: taste preference    Patient stated that he is still struggling to use less salt when he is cooking. He stated that he has been trying Mrs. Garrett, herbs/spices like garlic powder, onion powder, and gongora but still feels the food lacks flavor. I offered to send the patient resources with salt-free spice rub recipes that he can try to see if he like those any better. I also encouraged the patient to keep cutting back on salt to allow his taste buds to adjust.     Intervention(s): reducing sodium intake and reducing seasonings    Assigning the following patient goals: maintain low sodium diet      SDOH    INTERVENTION(S)  recommended diet modifications and encouragement/support    PLAN  patient verbalizes understanding and patient amenable to changes    Patient will try new salt-free spice rub recipes from the requested resource that I sent him to his personal email (per patient request) in an effort to further cut back on the amount of salt he is cooking with. Patient will continue to monitor his blood pressure regularly and his care team will continue to monitor him. Patient denied wanting to set any other new goals at this time.       There are no preventive care reminders to display for this patient.    Last 5 Patient Entered Readings                                      Current 30 Day Average: 127/82     Recent Readings 9/24/2019 9/20/2019 9/19/2019 9/18/2019 9/17/2019    SBP (mmHg) 128 135 126 126 123    DBP (mmHg) 79 82 79 83 84    Pulse 91 89  91 91 94

## 2019-10-10 ENCOUNTER — PATIENT OUTREACH (OUTPATIENT)
Dept: OTHER | Facility: OTHER | Age: 39
End: 2019-10-10

## 2019-10-10 DIAGNOSIS — I10 ESSENTIAL HYPERTENSION: Primary | ICD-10-CM

## 2019-10-10 NOTE — PROGRESS NOTES
Digital Medicine: Clinician Follow-Up    Per 30 day average, 128/82 mmHg, patient's BP is approaching goal.         Follow Up  Follow-up reason(s): reading review      Following up on referral to: sleep apnea  Mr. Melendez has not been able to schedule appointment with sleep medicine.           Sleep Apnea  Patient not previously diagnosed with DALTON and               PLAN  referral    Will resend referral to sleep medicine. No changes required to medication regimen at this time.    Will continue to monitor regularly.     Asked patient to call or message with questions or concerns.      Last 5 Patient Entered Readings                                      Current 30 Day Average: 128/82     Recent Readings 10/10/2019 10/8/2019 10/7/2019 10/4/2019 10/2/2019    SBP (mmHg) 129 126 122 130 131    DBP (mmHg) 83 83 78 83 83    Pulse 85 86 85 84 84             Hypertension Medications             amLODIPine (NORVASC) 10 MG tablet Take 1 tablet (10 mg total) by mouth once daily.    triamterene-hydrochlorothiazide 37.5-25 mg (DYAZIDE) 37.5-25 mg per capsule Take 1 capsule by mouth every morning.

## 2019-10-28 DIAGNOSIS — I10 ESSENTIAL HYPERTENSION: ICD-10-CM

## 2019-10-28 RX ORDER — TRIAMTERENE AND HYDROCHLOROTHIAZIDE 37.5; 25 MG/1; MG/1
1 CAPSULE ORAL EVERY MORNING
Qty: 30 CAPSULE | Refills: 1 | Status: SHIPPED | OUTPATIENT
Start: 2019-10-28 | End: 2020-01-02

## 2019-11-01 DIAGNOSIS — J32.9 VIRAL SINUSITIS: ICD-10-CM

## 2019-11-01 DIAGNOSIS — B97.89 VIRAL SINUSITIS: ICD-10-CM

## 2019-11-01 RX ORDER — FLUTICASONE PROPIONATE 50 MCG
SPRAY, SUSPENSION (ML) NASAL
Qty: 16 ML | Refills: 0 | Status: SHIPPED | OUTPATIENT
Start: 2019-11-01 | End: 2019-12-04 | Stop reason: SDUPTHER

## 2019-11-20 ENCOUNTER — PATIENT OUTREACH (OUTPATIENT)
Dept: OTHER | Facility: OTHER | Age: 39
End: 2019-11-20

## 2019-12-04 DIAGNOSIS — B97.89 VIRAL SINUSITIS: ICD-10-CM

## 2019-12-04 DIAGNOSIS — J32.9 VIRAL SINUSITIS: ICD-10-CM

## 2019-12-04 RX ORDER — FLUTICASONE PROPIONATE 50 MCG
SPRAY, SUSPENSION (ML) NASAL
Qty: 16 G | Refills: 0 | Status: SHIPPED | OUTPATIENT
Start: 2019-12-04 | End: 2020-01-01 | Stop reason: SDUPTHER

## 2019-12-10 NOTE — PROGRESS NOTES
"Digital Medicine: Health  Follow-Up    Called to follow up with patient, current Bp average 133/87 is not at goal <130/<80. Patient said he got out of his normal habit of taking daily readings with the holidays but is getting back into it.         Follow Up  Follow-up reason(s): reading review and routine education      Routine Education Topics: eating patterns        INTERVENTION(S)  encouragement/support and denied resources    PLAN  patient verbalizes understanding, Clinician follow-up, referral and continue monitoring    Encouraged patient not to give up on the sleep apnea test and that we are doing all we can. Waiting for update from patient's clinician, Ana Rodriguez on next steps with the referral to sleep clinic for the patient. Otherwise the patient denied any resources or goal setting at this time. Patient agreed to continue to take regular readings, [facility] care team will continue to monitor and I will follow up in 4 weeks.           There are no preventive care reminders to display for this patient.    Last 5 Patient Entered Readings                                      Current 30 Day Average: 133/87     Recent Readings 12/10/2019 12/9/2019 12/2/2019 11/20/2019 11/18/2019    SBP (mmHg) 136 125 132 134 132    DBP (mmHg) 92 85 82 88 83    Pulse 91 89 85 87 89             Patient asked: Patient asked about the referral to the sleep clinic. Patient has not heard from the sleep clinic. Per clinician, Ana Rodriguez's, last note on 10/10/19: "Will resend referral to sleep medicine. No changes required to medication regimen at this time.     Will continue to monitor regularly. Will follow up in 2-3 weeks, sooner if BP begins to trend upward or downward."    Sent secure, epic chat to Ana Rodriguez asking for an update on the situation and what the patient, or I can do to speed the process up.      Patient asked:     Patient asked:           Diet Screening   He has the following dietary restrictions: low " sodium diet    Patient reported increase in sodium intake over the holidays. Recommended moderation and portion control, patient stated he knew what he needed to do.     Intervention(s): portion control and reducing sodium intake      SDOH

## 2019-12-11 ENCOUNTER — TELEPHONE (OUTPATIENT)
Dept: SLEEP MEDICINE | Facility: CLINIC | Age: 39
End: 2019-12-11

## 2019-12-11 NOTE — PROGRESS NOTES
12/11/19    Conferenced with patient's clinician, Ana Rodriguez, about lack of contact from sleep clinic despite orders being placed. Clinician sent staff message to [facility] sleep clinic asking them to reach out ASAP to the patient for an appointment. Additionally called to provide sleep clinic's number for appointments to the patient so that he can call and try to initiate on his end also, LVM with the sleep clinic number and send a [employer] message with the information as well.

## 2020-01-01 DIAGNOSIS — J32.9 VIRAL SINUSITIS: ICD-10-CM

## 2020-01-01 DIAGNOSIS — I10 ESSENTIAL HYPERTENSION: ICD-10-CM

## 2020-01-01 DIAGNOSIS — B97.89 VIRAL SINUSITIS: ICD-10-CM

## 2020-01-01 RX ORDER — FLUTICASONE PROPIONATE 50 MCG
SPRAY, SUSPENSION (ML) NASAL
Qty: 16 G | Refills: 0 | Status: SHIPPED | OUTPATIENT
Start: 2020-01-01

## 2020-01-02 RX ORDER — TRIAMTERENE AND HYDROCHLOROTHIAZIDE 37.5; 25 MG/1; MG/1
1 CAPSULE ORAL EVERY MORNING
Qty: 30 CAPSULE | Refills: 0 | Status: SHIPPED | OUTPATIENT
Start: 2020-01-02 | End: 2020-01-17 | Stop reason: SDUPTHER

## 2020-01-14 ENCOUNTER — PATIENT OUTREACH (OUTPATIENT)
Dept: OTHER | Facility: OTHER | Age: 40
End: 2020-01-14

## 2020-01-17 DIAGNOSIS — I10 ESSENTIAL HYPERTENSION: ICD-10-CM

## 2020-01-17 RX ORDER — TRIAMTERENE AND HYDROCHLOROTHIAZIDE 37.5; 25 MG/1; MG/1
1 CAPSULE ORAL EVERY MORNING
Qty: 30 CAPSULE | Refills: 0 | Status: SHIPPED | OUTPATIENT
Start: 2020-01-17 | End: 2020-01-23 | Stop reason: SDUPTHER

## 2020-01-23 ENCOUNTER — OFFICE VISIT (OUTPATIENT)
Dept: FAMILY MEDICINE | Facility: CLINIC | Age: 40
End: 2020-01-23
Payer: COMMERCIAL

## 2020-01-23 VITALS
HEIGHT: 73 IN | SYSTOLIC BLOOD PRESSURE: 124 MMHG | RESPIRATION RATE: 14 BRPM | BODY MASS INDEX: 37.67 KG/M2 | WEIGHT: 284.19 LBS | OXYGEN SATURATION: 96 % | TEMPERATURE: 98 F | DIASTOLIC BLOOD PRESSURE: 64 MMHG | HEART RATE: 90 BPM

## 2020-01-23 DIAGNOSIS — Z83.3 FAMILY HISTORY OF DIABETES MELLITUS: ICD-10-CM

## 2020-01-23 DIAGNOSIS — S29.011A MUSCLE STRAIN OF CHEST WALL, INITIAL ENCOUNTER: ICD-10-CM

## 2020-01-23 DIAGNOSIS — E78.6 HDL DEFICIENCY: ICD-10-CM

## 2020-01-23 DIAGNOSIS — R73.03 PREDIABETES: ICD-10-CM

## 2020-01-23 DIAGNOSIS — I10 ESSENTIAL HYPERTENSION: Primary | ICD-10-CM

## 2020-01-23 DIAGNOSIS — Z82.49 FAMILY HISTORY OF ISCHEMIC HEART DISEASE: ICD-10-CM

## 2020-01-23 DIAGNOSIS — R07.89 ATYPICAL CHEST PAIN: ICD-10-CM

## 2020-01-23 DIAGNOSIS — Z13.220 SCREENING FOR HYPERLIPIDEMIA: ICD-10-CM

## 2020-01-23 DIAGNOSIS — E78.5 DYSLIPIDEMIA WITH ELEVATED LOW DENSITY LIPOPROTEIN CHOLESTEROL AND ABNORMALLY LOW HIGH DENSITY LIPOPROTEIN CHOLESTEROL: ICD-10-CM

## 2020-01-23 DIAGNOSIS — Z11.4 ENCOUNTER FOR SCREENING FOR HIV: ICD-10-CM

## 2020-01-23 PROCEDURE — 93010 EKG 12-LEAD: ICD-10-PCS | Mod: S$GLB,,, | Performed by: INTERNAL MEDICINE

## 2020-01-23 PROCEDURE — 93005 EKG 12-LEAD: ICD-10-PCS | Mod: S$GLB,,, | Performed by: NURSE PRACTITIONER

## 2020-01-23 PROCEDURE — 93005 ELECTROCARDIOGRAM TRACING: CPT | Mod: S$GLB,,, | Performed by: NURSE PRACTITIONER

## 2020-01-23 PROCEDURE — 3078F DIAST BP <80 MM HG: CPT | Mod: CPTII,S$GLB,, | Performed by: NURSE PRACTITIONER

## 2020-01-23 PROCEDURE — 3078F PR MOST RECENT DIASTOLIC BLOOD PRESSURE < 80 MM HG: ICD-10-PCS | Mod: CPTII,S$GLB,, | Performed by: NURSE PRACTITIONER

## 2020-01-23 PROCEDURE — 3008F PR BODY MASS INDEX (BMI) DOCUMENTED: ICD-10-PCS | Mod: CPTII,S$GLB,, | Performed by: NURSE PRACTITIONER

## 2020-01-23 PROCEDURE — 99999 PR PBB SHADOW E&M-EST. PATIENT-LVL IV: ICD-10-PCS | Mod: PBBFAC,,, | Performed by: NURSE PRACTITIONER

## 2020-01-23 PROCEDURE — 99214 OFFICE O/P EST MOD 30 MIN: CPT | Mod: S$GLB,,, | Performed by: NURSE PRACTITIONER

## 2020-01-23 PROCEDURE — 99999 PR PBB SHADOW E&M-EST. PATIENT-LVL IV: CPT | Mod: PBBFAC,,, | Performed by: NURSE PRACTITIONER

## 2020-01-23 PROCEDURE — 3074F SYST BP LT 130 MM HG: CPT | Mod: CPTII,S$GLB,, | Performed by: NURSE PRACTITIONER

## 2020-01-23 PROCEDURE — 99214 PR OFFICE/OUTPT VISIT, EST, LEVL IV, 30-39 MIN: ICD-10-PCS | Mod: S$GLB,,, | Performed by: NURSE PRACTITIONER

## 2020-01-23 PROCEDURE — 93010 ELECTROCARDIOGRAM REPORT: CPT | Mod: S$GLB,,, | Performed by: INTERNAL MEDICINE

## 2020-01-23 PROCEDURE — 3074F PR MOST RECENT SYSTOLIC BLOOD PRESSURE < 130 MM HG: ICD-10-PCS | Mod: CPTII,S$GLB,, | Performed by: NURSE PRACTITIONER

## 2020-01-23 PROCEDURE — 3008F BODY MASS INDEX DOCD: CPT | Mod: CPTII,S$GLB,, | Performed by: NURSE PRACTITIONER

## 2020-01-23 RX ORDER — TRIAMTERENE AND HYDROCHLOROTHIAZIDE 37.5; 25 MG/1; MG/1
1 CAPSULE ORAL EVERY MORNING
Qty: 90 CAPSULE | Refills: 1 | Status: SHIPPED | OUTPATIENT
Start: 2020-01-23 | End: 2020-08-18

## 2020-01-23 RX ORDER — AMLODIPINE BESYLATE 10 MG/1
10 TABLET ORAL DAILY
Qty: 90 TABLET | Refills: 1 | Status: SHIPPED | OUTPATIENT
Start: 2020-01-23 | End: 2020-05-29 | Stop reason: SDUPTHER

## 2020-01-23 RX ORDER — NAPROXEN 500 MG/1
500 TABLET ORAL 2 TIMES DAILY WITH MEALS
Qty: 20 TABLET | Refills: 0 | Status: SHIPPED | OUTPATIENT
Start: 2020-01-23 | End: 2020-02-02

## 2020-01-23 NOTE — PATIENT INSTRUCTIONS
We will try an anti-inflammatory for the chest pain to see if it is muscle.  If no improvement in one week- we will try a medicine for heartburn daily.   At that time, I will also schedule an appointment for you to see the heart doctor.  We will schedule your blood work when you are fasting.

## 2020-01-23 NOTE — PROGRESS NOTES
History of Present Illness   Dewey Melendez is a 39 y.o. man with medical history as listed below who presents today for routine follow-up, hypertension. He is taking medications as prescribed without perceived SE. He does not monitor his BP at home. We will address his HM today. He also would like to discuss chest pain and a rash on his leg.    1. He reports chest pain x 2 days. Pain is intermittent and only present with certain movements and when stretching. Pain is a burning and pulling pain. Pain began after doing some heavy lifting over the weekend. He denies palpitations, shortness of breath, swelling to lower extremities, or lightheadedness. He reports that this does not feel like his usual heartburn symptoms. He has not tried OTC medication for symptoms.    2. He also reports noticing a red patch to his left anterior thigh about 3 days ago. There was gradually swelling to the area followed by drainage of purulent material. There is now a scab in the area. He denies surrounding redness, warmth, itching, or pain to the area.     He has no additional complaints and is otherwise healthy on today's visit.    Past Medical History:   Diagnosis Date    Dyslipidemia with elevated low density lipoprotein cholesterol and abnormally low high density lipoprotein cholesterol 2/24/2014    Hypertension        Past Surgical History:   Procedure Laterality Date    NO PAST SURGERIES         Social History     Socioeconomic History    Marital status: Single     Spouse name: Not on file    Number of children: Not on file    Years of education: Not on file    Highest education level: Not on file   Occupational History    Not on file   Social Needs    Financial resource strain: Not on file    Food insecurity:     Worry: Not on file     Inability: Not on file    Transportation needs:     Medical: Not on file     Non-medical: Not on file   Tobacco Use    Smoking status: Never Smoker    Smokeless tobacco: Never  "Used   Substance and Sexual Activity    Alcohol use: Yes     Comment: Very seldom beer or mixed drink.    Drug use: No    Sexual activity: Not on file   Lifestyle    Physical activity:     Days per week: Not on file     Minutes per session: Not on file    Stress: Not on file   Relationships    Social connections:     Talks on phone: Not on file     Gets together: Not on file     Attends Buddhism service: Not on file     Active member of club or organization: Not on file     Attends meetings of clubs or organizations: Not on file     Relationship status: Not on file   Other Topics Concern    Not on file   Social History Narrative    Not on file       Family History   Problem Relation Age of Onset    Diabetes Mother     Cataracts Mother     Blindness Maternal Grandmother     Cataracts Maternal Grandmother     Glaucoma Maternal Grandmother        Review of Systems  Review of Systems   Constitutional: Negative for chills and fever.   Eyes: Negative for blurred vision and double vision.   Respiratory: Negative for cough, sputum production, shortness of breath and wheezing.    Cardiovascular: Positive for chest pain (atypical). Negative for palpitations, orthopnea, claudication and leg swelling.   Skin: Positive for rash. Negative for itching.   Neurological: Negative for dizziness, loss of consciousness and headaches.     A complete review of systems was otherwise negative.    Physical Exam  /64   Pulse 90   Temp 98.2 °F (36.8 °C) (Oral)   Resp 14   Ht 6' 1" (1.854 m)   Wt 128.9 kg (284 lb 2.8 oz)   SpO2 96%   BMI 37.49 kg/m²   General appearance: alert, appears stated age, cooperative and no distress  Eyes: negative findings: lids and lashes normal and conjunctivae and sclerae normal  Neck: no carotid bruit, no JVD and supple, symmetrical, trachea midline  Back: symmetric, no curvature. ROM normal. No CVA tenderness.  Lungs: clear to auscultation bilaterally  Heart: regular rate and rhythm, " S1, S2 normal, no murmur, click, rub or gallop  Extremities: extremities normal, atraumatic, no cyanosis or edema  Pulses: 2+ and symmetric  Skin: Skin color, texture, turgor normal. No rashes or lesions or small area of induration noted to left anterior thigh, likely healing abscess with no evidence of secondary cellulitis  Neurologic: Grossly normal    Assessment/Plan  Essential hypertension  The current medical regimen is effective;  continue present plan and medications.  Refill today.  -     triamterene-hydrochlorothiazide 37.5-25 mg (DYAZIDE) 37.5-25 mg per capsule; Take 1 capsule by mouth every morning.  Dispense: 90 capsule; Refill: 1  -     amLODIPine (NORVASC) 10 MG tablet; Take 1 tablet (10 mg total) by mouth once daily.  Dispense: 90 tablet; Refill: 1    Atypical chest pain  EKG NSR with t-wave abnormality, unchanged from prior.  Atypical and consistent with MSK etiology.  Labs as listed below.  Trial of NSAID for pain relief. If no improvement, consider trial of PPI and cardiology referral.  -     CBC auto differential; Future; Expected date: 01/23/2020  -     Comprehensive metabolic panel; Future; Expected date: 01/23/2020  -     TSH; Future; Expected date: 01/23/2020  -     IN OFFICE EKG 12-LEAD (to Muse)    Prediabetes  The current medical regimen is effective;  continue present plan and medications.  Labs scheduled.  -     Hemoglobin A1c; Future; Expected date: 01/23/2020    Screening for hyperlipidemia  The current medical regimen is effective;  continue present plan and medications.  Fasting labs scheduled. Calculate ASCVD with new lipid panel.  -     Lipid panel; Future; Expected date: 01/23/2020    Encounter for screening for HIV  Agreeable to routine screening.  -     HIV 1/2 Ag/Ab (4th Gen); Future; Expected date: 01/23/2020    Muscle strain of chest wall, initial encounter  As above.  -     naproxen (EC NAPROSYN) 500 MG EC tablet; Take 1 tablet (500 mg total) by mouth 2 (two) times daily with  meals. for 10 days  Dispense: 20 tablet; Refill: 0    Patient has verbalized understanding and is in agreement with plan of care.    Follow up for 3-6 months pending lab results.

## 2020-01-24 ENCOUNTER — TELEPHONE (OUTPATIENT)
Dept: FAMILY MEDICINE | Facility: CLINIC | Age: 40
End: 2020-01-24

## 2020-01-24 NOTE — TELEPHONE ENCOUNTER
----- Message from Jocelyn Neal sent at 1/24/2020  1:44 PM CST -----  Contact: Patient [phone]  Type: Patient Call Back    Who called: Patient    What is the request in detail: naproxen (EC NAPROSYN) 500 MG EC tablet, which was prescribed yesterday is not covered by his insurance. Calling to find out if he can get another medication that's covered by his insurance. Please call pt.  .  [id number] [address] AT [address] [address]  Phone: [phone] Fax: [phone]    Would the patient rather a call back or a response via [facility]? Call back    Best call back number: [phone]

## 2020-01-31 ENCOUNTER — LAB VISIT (OUTPATIENT)
Dept: LAB | Facility: HOSPITAL | Age: 40
End: 2020-01-31
Payer: COMMERCIAL

## 2020-01-31 DIAGNOSIS — Z11.4 ENCOUNTER FOR SCREENING FOR HIV: ICD-10-CM

## 2020-01-31 DIAGNOSIS — Z13.220 SCREENING FOR HYPERLIPIDEMIA: ICD-10-CM

## 2020-01-31 DIAGNOSIS — R73.03 PREDIABETES: ICD-10-CM

## 2020-01-31 DIAGNOSIS — R07.89 ATYPICAL CHEST PAIN: ICD-10-CM

## 2020-01-31 LAB
ALBUMIN SERPL BCP-MCNC: 3.9 G/DL (ref 3.5–5.2)
ALP SERPL-CCNC: 58 U/L (ref 55–135)
ALT SERPL W/O P-5'-P-CCNC: 27 U/L (ref 10–44)
ANION GAP SERPL CALC-SCNC: 9 MMOL/L (ref 8–16)
AST SERPL-CCNC: 25 U/L (ref 10–40)
BASOPHILS # BLD AUTO: 0.05 K/UL (ref 0–0.2)
BASOPHILS NFR BLD: 0.5 % (ref 0–1.9)
BILIRUB SERPL-MCNC: 0.7 MG/DL (ref 0.1–1)
BUN SERPL-MCNC: 12 MG/DL (ref 6–20)
CALCIUM SERPL-MCNC: 9.3 MG/DL (ref 8.7–10.5)
CHLORIDE SERPL-SCNC: 106 MMOL/L (ref 95–110)
CHOLEST SERPL-MCNC: 199 MG/DL (ref 120–199)
CHOLEST/HDLC SERPL: 5 {RATIO} (ref 2–5)
CO2 SERPL-SCNC: 29 MMOL/L (ref 23–29)
CREAT SERPL-MCNC: 1.3 MG/DL (ref 0.5–1.4)
DIFFERENTIAL METHOD: ABNORMAL
EOSINOPHIL # BLD AUTO: 0.3 K/UL (ref 0–0.5)
EOSINOPHIL NFR BLD: 2.5 % (ref 0–8)
ERYTHROCYTE [DISTWIDTH] IN BLOOD BY AUTOMATED COUNT: 13.3 % (ref 11.5–14.5)
EST. GFR  (AFRICAN AMERICAN): >60 ML/MIN/1.73 M^2
EST. GFR  (NON AFRICAN AMERICAN): >60 ML/MIN/1.73 M^2
ESTIMATED AVG GLUCOSE: 140 MG/DL (ref 68–131)
GLUCOSE SERPL-MCNC: 119 MG/DL (ref 70–110)
HBA1C MFR BLD HPLC: 6.5 % (ref 4–5.6)
HCT VFR BLD AUTO: 44.9 % (ref 40–54)
HDLC SERPL-MCNC: 40 MG/DL (ref 40–75)
HDLC SERPL: 20.1 % (ref 20–50)
HGB BLD-MCNC: 14.3 G/DL (ref 14–18)
HIV 1+2 AB+HIV1 P24 AG SERPL QL IA: NEGATIVE
IMM GRANULOCYTES # BLD AUTO: 0.03 K/UL (ref 0–0.04)
IMM GRANULOCYTES NFR BLD AUTO: 0.3 % (ref 0–0.5)
LDLC SERPL CALC-MCNC: 136.8 MG/DL (ref 63–159)
LYMPHOCYTES # BLD AUTO: 3.4 K/UL (ref 1–4.8)
LYMPHOCYTES NFR BLD: 33.1 % (ref 18–48)
MCH RBC QN AUTO: 28.1 PG (ref 27–31)
MCHC RBC AUTO-ENTMCNC: 31.8 G/DL (ref 32–36)
MCV RBC AUTO: 88 FL (ref 82–98)
MONOCYTES # BLD AUTO: 1 K/UL (ref 0.3–1)
MONOCYTES NFR BLD: 9.2 % (ref 4–15)
NEUTROPHILS # BLD AUTO: 5.7 K/UL (ref 1.8–7.7)
NEUTROPHILS NFR BLD: 54.4 % (ref 38–73)
NONHDLC SERPL-MCNC: 159 MG/DL
NRBC BLD-RTO: 0 /100 WBC
PLATELET # BLD AUTO: 273 K/UL (ref 150–350)
PMV BLD AUTO: 11.3 FL (ref 9.2–12.9)
POTASSIUM SERPL-SCNC: 3.8 MMOL/L (ref 3.5–5.1)
PROT SERPL-MCNC: 7 G/DL (ref 6–8.4)
RBC # BLD AUTO: 5.08 M/UL (ref 4.6–6.2)
SODIUM SERPL-SCNC: 144 MMOL/L (ref 136–145)
TRIGL SERPL-MCNC: 111 MG/DL (ref 30–150)
TSH SERPL DL<=0.005 MIU/L-ACNC: 1.24 UIU/ML (ref 0.4–4)
WBC # BLD AUTO: 10.4 K/UL (ref 3.9–12.7)

## 2020-01-31 PROCEDURE — 36415 COLL VENOUS BLD VENIPUNCTURE: CPT | Mod: PO

## 2020-01-31 PROCEDURE — 85025 COMPLETE CBC W/AUTO DIFF WBC: CPT

## 2020-01-31 PROCEDURE — 83036 HEMOGLOBIN GLYCOSYLATED A1C: CPT

## 2020-01-31 PROCEDURE — 86703 HIV-1/HIV-2 1 RESULT ANTBDY: CPT

## 2020-01-31 PROCEDURE — 84443 ASSAY THYROID STIM HORMONE: CPT

## 2020-01-31 PROCEDURE — 80061 LIPID PANEL: CPT

## 2020-01-31 PROCEDURE — 80053 COMPREHEN METABOLIC PANEL: CPT

## 2020-02-05 ENCOUNTER — TELEPHONE (OUTPATIENT)
Dept: FAMILY MEDICINE | Facility: CLINIC | Age: 40
End: 2020-02-05

## 2020-02-05 NOTE — TELEPHONE ENCOUNTER
Please let the patient know that his labs overall look good, with the exception of his sugar levels which are elevated, just in the range for diabetes. We should consider starting medication for this or lifestyle changes. We can schedule an appointment with Dr. Castro or myself to discuss this.    Thanks!  RAYMUNDO Sullivan

## 2020-02-12 ENCOUNTER — OFFICE VISIT (OUTPATIENT)
Dept: FAMILY MEDICINE | Facility: CLINIC | Age: 40
End: 2020-02-12
Payer: COMMERCIAL

## 2020-02-12 VITALS
OXYGEN SATURATION: 97 % | HEART RATE: 99 BPM | BODY MASS INDEX: 37.5 KG/M2 | SYSTOLIC BLOOD PRESSURE: 132 MMHG | TEMPERATURE: 99 F | WEIGHT: 282.94 LBS | DIASTOLIC BLOOD PRESSURE: 86 MMHG | HEIGHT: 73 IN

## 2020-02-12 DIAGNOSIS — I10 ESSENTIAL HYPERTENSION: ICD-10-CM

## 2020-02-12 DIAGNOSIS — E11.9 TYPE 2 DIABETES MELLITUS WITHOUT COMPLICATION, WITHOUT LONG-TERM CURRENT USE OF INSULIN: Primary | ICD-10-CM

## 2020-02-12 DIAGNOSIS — E66.9 OBESITY (BMI 35.0-39.9 WITHOUT COMORBIDITY): ICD-10-CM

## 2020-02-12 DIAGNOSIS — E78.5 DYSLIPIDEMIA WITH ELEVATED LOW DENSITY LIPOPROTEIN CHOLESTEROL AND ABNORMALLY LOW HIGH DENSITY LIPOPROTEIN CHOLESTEROL: ICD-10-CM

## 2020-02-12 PROCEDURE — 99214 PR OFFICE/OUTPT VISIT, EST, LEVL IV, 30-39 MIN: ICD-10-PCS | Mod: S$GLB,,, | Performed by: NURSE PRACTITIONER

## 2020-02-12 PROCEDURE — 3044F HG A1C LEVEL LT 7.0%: CPT | Mod: CPTII,S$GLB,, | Performed by: NURSE PRACTITIONER

## 2020-02-12 PROCEDURE — 3008F BODY MASS INDEX DOCD: CPT | Mod: CPTII,S$GLB,, | Performed by: NURSE PRACTITIONER

## 2020-02-12 PROCEDURE — 99214 OFFICE O/P EST MOD 30 MIN: CPT | Mod: S$GLB,,, | Performed by: NURSE PRACTITIONER

## 2020-02-12 PROCEDURE — 99999 PR PBB SHADOW E&M-EST. PATIENT-LVL IV: ICD-10-PCS | Mod: PBBFAC,,, | Performed by: NURSE PRACTITIONER

## 2020-02-12 PROCEDURE — 3044F PR MOST RECENT HEMOGLOBIN A1C LEVEL <7.0%: ICD-10-PCS | Mod: CPTII,S$GLB,, | Performed by: NURSE PRACTITIONER

## 2020-02-12 PROCEDURE — 99999 PR PBB SHADOW E&M-EST. PATIENT-LVL IV: CPT | Mod: PBBFAC,,, | Performed by: NURSE PRACTITIONER

## 2020-02-12 PROCEDURE — 3079F DIAST BP 80-89 MM HG: CPT | Mod: CPTII,S$GLB,, | Performed by: NURSE PRACTITIONER

## 2020-02-12 PROCEDURE — 3008F PR BODY MASS INDEX (BMI) DOCUMENTED: ICD-10-PCS | Mod: CPTII,S$GLB,, | Performed by: NURSE PRACTITIONER

## 2020-02-12 PROCEDURE — 3075F PR MOST RECENT SYSTOLIC BLOOD PRESS GE 130-139MM HG: ICD-10-PCS | Mod: CPTII,S$GLB,, | Performed by: NURSE PRACTITIONER

## 2020-02-12 PROCEDURE — 3075F SYST BP GE 130 - 139MM HG: CPT | Mod: CPTII,S$GLB,, | Performed by: NURSE PRACTITIONER

## 2020-02-12 PROCEDURE — 3079F PR MOST RECENT DIASTOLIC BLOOD PRESSURE 80-89 MM HG: ICD-10-PCS | Mod: CPTII,S$GLB,, | Performed by: NURSE PRACTITIONER

## 2020-02-12 RX ORDER — METFORMIN HYDROCHLORIDE 500 MG/1
500 TABLET, EXTENDED RELEASE ORAL
Qty: 90 TABLET | Refills: 1 | Status: SHIPPED | OUTPATIENT
Start: 2020-02-12 | End: 2020-08-14

## 2020-02-12 NOTE — PROGRESS NOTES
History of Present Illness   Dewey Melendez is a 39 y.o. man with medical history as listed below who presents today for evaluation of elevated blood glucose levels. He was seen by myself 01/23/2020 and labs revealed elevated hemoglobin A1c at 6.5 and fasting glucose of 119. He was not formerly diagnosed with T2DM, but one year prior was in prediabetic range with hemoglobin A1c of 5.8. Diet recall: he does not monitor what he eats; eats a fair amount of processed foods; eats sweets and drinks sodas. Exercise regimen: does not exercise. He reports family history of T2DM.     Polydipsia: None.  Polyphagia: None.  Polyuria: None.  Fatigue: Yes.  Blurred vision: None.  Weight loss: None.  Nausea/Vomiting/GI upset: None.  Rash/Skin changes: None.  Chest pain: None.  Palpitations: None.    Past Medical History:   Diagnosis Date    Dyslipidemia with elevated low density lipoprotein cholesterol and abnormally low high density lipoprotein cholesterol 2/24/2014    Hypertension        Past Surgical History:   Procedure Laterality Date    NO PAST SURGERIES         Social History     Socioeconomic History    Marital status: Single     Spouse name: Not on file    Number of children: Not on file    Years of education: Not on file    Highest education level: Not on file   Occupational History    Not on file   Social Needs    Financial resource strain: Not on file    Food insecurity:     Worry: Not on file     Inability: Not on file    Transportation needs:     Medical: Not on file     Non-medical: Not on file   Tobacco Use    Smoking status: Never Smoker    Smokeless tobacco: Never Used   Substance and Sexual Activity    Alcohol use: Yes     Comment: Very seldom beer or mixed drink.    Drug use: No    Sexual activity: Not on file   Lifestyle    Physical activity:     Days per week: Not on file     Minutes per session: Not on file    Stress: Not on file   Relationships    Social connections:     Talks on  "phone: Not on file     Gets together: Not on file     Attends Restorationism service: Not on file     Active member of club or organization: Not on file     Attends meetings of clubs or organizations: Not on file     Relationship status: Not on file   Other Topics Concern    Not on file   Social History Narrative    Not on file       Family History   Problem Relation Age of Onset    Diabetes Mother     Cataracts Mother     Blindness Maternal Grandmother     Cataracts Maternal Grandmother     Glaucoma Maternal Grandmother        Review of Systems  Review of Systems   Constitutional: Negative for malaise/fatigue and weight loss.   Eyes: Negative for blurred vision and double vision.   Cardiovascular: Negative for chest pain, palpitations, orthopnea and leg swelling.   Gastrointestinal: Negative for nausea and vomiting.   Genitourinary: Negative for frequency, hematuria and urgency.   Skin: Negative for rash.   Neurological: Negative for dizziness, loss of consciousness and headaches.   Endo/Heme/Allergies: Negative for polydipsia.     A complete review of systems was otherwise negative.    Physical Exam  /86   Pulse 99   Temp 98.7 °F (37.1 °C) (Oral)   Ht 6' 1" (1.854 m)   Wt 128.4 kg (282 lb 15.4 oz)   SpO2 97%   BMI 37.33 kg/m²   General appearance: alert, appears stated age, cooperative and no distress  Eyes: negative findings: lids and lashes normal, conjunctivae and sclerae normal and pupils equal, round, reactive to light and accomodation  Neck: no carotid bruit, no JVD and supple, symmetrical, trachea midline  Back: symmetric, no curvature. ROM normal. No CVA tenderness.  Lungs: clear to auscultation bilaterally  Heart: regular rate and rhythm, S1, S2 normal, no murmur, click, rub or gallop  Extremities: extremities normal, atraumatic, no cyanosis or edema  Pulses: 2+ and symmetric  Skin: Skin color, texture, turgor normal. No rashes or lesions  Neurologic: Grossly normal    Assessment/Plan  Type " 2 diabetes mellitus without complication, without long-term current use of insulin  The patient is asked to make an attempt to improve diet and exercise patterns to aid in medical management of this problem. We specifically discussed cutting calorie intake by 500-1000 calories per day for a goal of a 1-2 pound weight loss per week and recommendations for a mostly plant based diet with limited red meats/refined grains/processed foods/added sugars.  Start Metformin XR daily with breakfast.  Referral to diabetic education and handout provided.  Return in 3 months for labs with follow-up.  -     metFORMIN (GLUCOPHAGE-XR) 500 MG XR 24hr tablet; Take 1 tablet (500 mg total) by mouth daily with breakfast.  Dispense: 90 tablet; Refill: 1  -     Ambulatory referral/consult to Diabetes Education; Future; Expected date: 02/19/2020  -     Hemoglobin A1c; Future; Expected date: 05/12/2020  -     Basic metabolic panel; Future; Expected date: 05/12/2020    Obesity (BMI 35.0-39.9 without comorbidity)  The patient is asked to make an attempt to improve diet and exercise patterns to aid in medical management of this problem. We specifically discussed cutting calorie intake by 500-1000 calories per day for a goal of a 1-2 pound weight loss per week and recommendations for a mostly plant based diet with limited red meats/refined grains/processed foods/added sugars.  He also plans to start riding his bike daily for exercise- encouraged compliance with this.    Essential hypertension  The current medical regimen is effective;  continue present plan and medications.    Dyslipidemia with elevated low density lipoprotein cholesterol and abnormally low high density lipoprotein cholesterol  The current medical regimen is effective;  continue present plan and medications.    Patient has verbalized understanding and is in agreement with plan of care.    Follow up in about 3 months (around 5/12/2020) for routine follow-up with labs.

## 2020-02-13 NOTE — PATIENT INSTRUCTIONS
Eat Fit Rayne Vy  My Fitness Pal Vy  Understanding Carbohydrates, Fats, and Protein  Food is a source of fuel and nourishment for your body. Its also a source of pleasure. Having diabetes doesnt mean you have to eat special foods or give up desserts. Instead, your dietitian can show you how to plan meals to suit your body. To start, learn how different foods affect blood sugar.  Carbohydrates  Carbohydrates are the main source of fuel for the body. Carbohydrates raise blood sugar. Many people think carbohydrates are only found in pasta or bread. But carbohydrates are actually in many kinds of foods:  · Sugars occur naturally in foods such as fruit, milk, honey, and molasses. Sugars can also be added to many foods, from cereals and yogurt to candy and desserts. Sugars raise blood sugar.  · Starches are found in bread, cereals, pasta, and dried beans. Theyre also found in corn, peas, potatoes, yam, acorn squash, and butternut squash. Starches also raise blood sugar.   · Fiber is found in foods such as vegetables, fruits, beans, and whole grains. Unlike other carbs, fiber isnt digested or absorbed. So it doesnt raise blood sugar. In fact, fiber can help keep blood sugar from rising too fast. It also helps keep blood cholesterol at a healthy level.  Did you know?  Even though carbohydrates raise blood sugar, its best to have some in every meal. They are an important part of a healthy diet.   Fat  Fat is an energy source that can be stored until needed. Fat does not raise blood sugar. However, it can raise blood cholesterol, increasing the risk of heart disease. Fat is also high in calories, which can cause weight gain. Not all types of fat are the same.  More Healthy:  · Monounsaturated fats are mostly found in vegetable oils, such as olive, canola, and peanut oils. They are also found in avocados and some nuts. Monounsaturated fats are healthy for your heart. Thats because they lower LDL (unhealthy)  cholesterol.  · Polyunsaturated fats are mostly found in vegetable oils, such as corn, safflower, and soybean oils. They are also found in some seeds, nuts, and fish. Polyunsaturated fats lower LDL (unhealthy) cholesterol. So, choosing them instead of saturated fats is healthy for your heart. Certain unsaturated fats can help lower triglycerides.   Less Healthy:  · Saturated fats are found in animal products, such as meat, poultry, whole milk, lard, and butter. Saturated fats raise LDL cholesterol and are not healthy for your heart.  · Hydrogenated oils and trans fats are formed when vegetable oils are processed into solid fats. They are found in many processed foods. Hydrogenated oils and trans fats raise LDL cholesterol and lower HDL (healthy) cholesterol. They are not healthy for your heart.  Protein  Protein helps the body build and repair muscle and other tissue. Protein has little or no effect on blood sugar. However, many foods that contain protein also contain saturated fat. By choosing low-fat protein sources, you can get the benefits of protein without the extra fat:  · Plant protein is found in dry beans and peas, nuts, and soy products, such as tofu and soymilk. These sources tend to be cholesterol-free and low in saturated fat.  · Animal protein is found in fish, poultry, meat, cheese, milk, and eggs. These contain cholesterol and can be high in saturated fat. Aim for lean, lower-fat choices.  Date Last Reviewed: 3/1/2016  © 4729-5453 [employer]. [address] [place] [address]. All rights reserved. This information is not intended as a substitute for professional medical care. Always follow your healthcare professional's instructions.        Diet: Diabetes  Food is an important tool that you can use to control diabetes and stay healthy. Eating well-balanced meals in the correct amounts will help you control your blood glucose levels and prevent low blood sugar reactions. It  will also help you reduce the health risks of diabetes. There is no one specific diet that is right for everyone with diabetes. But there are general guidelines to follow. A registered dietitian (RD) will create a tailored diet approach thats just right for you. He or she will also help you plan healthy meals and snacks. If you have any questions, call your dietitian for advice.     Guidelines for success  Talk with your healthcare provider before starting a diabetes diet or weight loss program. If you haven't talked with a dietitian yet, ask your provider for a referral. The following guidelines can help you succeed:  · Select foods from the 6 food groups below. Your dietitian will help you find food choices within each group. He or she will also show you serving sizes and how many servings you can have at each meal.  ¨ Grains, beans, and starchy vegetables  ¨ Vegetables  ¨ Fruit  ¨ Milk or yogurt  ¨ Meat, poultry, fish, or tofu  ¨ Healthy fats  · Check your blood sugar levels as directed by your provider. Take any medicine as prescribed by your provider.  · Learn to read food labels and pick the right portion sizes.  · Eat only the amount of food in your meal plan. Eat about the same amount of food at regular times each day. Dont skip meals. Eat meals 4 to 5 hours apart, with snacks in between.  · Limit alcohol. It raises blood sugar levels. Drink water or calorie-free diet drinks that use safe sweeteners.  · Eat less fat to help lower your risk of heart disease. Use nonfat or low-fat dairy products and lean meats. Avoid fried foods. Use cooking oils that are unsaturated, such as olive, canola, or peanut oil.  · Talk with your dietitian about safe sugar substitutes.  · Avoid added salt. It can contribute to high blood pressure, which can cause heart disease. People with diabetes already have a risk of high blood pressure and heart disease.  · Stay at a healthy weight. If you need to lose weight, cut down on your  portion sizes. But dont skip meals. Exercise is an important part of any weight management program. Talk with your provider about an exercise program thats right for you.  · For more information about the best diet plan for you, talk with a registered dietitian (RD). To find an RD in your area, contact:  ¨ Academy of Nutrition and Dietetics www.eatright.org  ¨ The American Diabetes Association 754-478-7266 www.diabetes.org  Date Last Reviewed: 8/1/2016 © 2000-2017 [employer]. [address] [place] [address]. All rights reserved. This information is not intended as a substitute for professional medical care. Always follow your healthcare professional's instructions.        Eating Out When You Have Diabetes  Eating right is an important part of keeping your blood sugar in your target range. You just need to make healthy choices.    Tips for restaurant meals  When you eat away from home try these tips:  · Try to schedule your dining-out meal at your normal meal time. Make a reservation if possible, so you don't have to wait to eat. If you can't make a reservation, try to arrive at the restaurant at a less-busy time to cut down your wait time. Eat a small fruit or starch snack at your regular mealtime if your restaurant meal is going to be later than usual.   · Call ahead to see if the restaurant can meet your dietary needs if you've never been there before. Or you can go online to see the menu ahead of time.  · Carry some crackers with you in case the restaurant needs you to wait until you can be served.  · Ask how foods are prepared before you order.  · Instead of fried, sautéed, or breaded foods, choose ones that are broiled, steamed, grilled, or baked.  · Ask for sauces, gravies, and dressings on the side.  · Only eat an amount that fits your meal plan. Remember: You can take home the leftovers.  · Save dessert for special occasions. Then choose a small dessert or share one with a friend  or family member.  Make healthy choices  Fast food  · Garden salad with light dressing on the side  · Baked potato with vegetables or herbs  · Broiled, roasted, or grilled chicken sandwich  · Sliced turkey or lean roast beef sandwich  Mexican  · Chicken enchilada, without cheese or sour cream   · Small burrito with whole beans and chicken  · Whole beans (not refried) and rice  · Chicken or fish fajitas  Steakhouse  · Grilled or broiled lean cuts of beef  · Baked potato with vegetables or herbs  · Broiled or baked chicken. Dont eat the skin.  · Steamed vegetables  Asian  · Steamed dumplings or potstickers  · Broiled, boiled, or steamed meats or fish  · Sushi or sashimi  · Steamed rice or boiled noodles. One serving is equal to 1/3 cup.  Date Last Reviewed: 6/1/2016  © 3198-1686 [employer]. [address] [place] [address], [place] [address]. All rights reserved. This information is not intended as a substitute for professional medical care. Always follow your healthcare professional's instructions.

## 2020-02-20 ENCOUNTER — PATIENT OUTREACH (OUTPATIENT)
Dept: ADMINISTRATIVE | Facility: OTHER | Age: 40
End: 2020-02-20

## 2020-03-15 ENCOUNTER — PATIENT OUTREACH (OUTPATIENT)
Dept: ADMINISTRATIVE | Facility: OTHER | Age: 40
End: 2020-03-15

## 2020-03-16 NOTE — PROGRESS NOTES
Chart reviewed.   Immunizations: Triggered Imm Registry     Orders placed: n/a  Upcoming appts to satisfy JAEL topics: n/a

## 2020-03-17 ENCOUNTER — OFFICE VISIT (OUTPATIENT)
Dept: SLEEP MEDICINE | Facility: CLINIC | Age: 40
End: 2020-03-17
Payer: COMMERCIAL

## 2020-03-17 VITALS
BODY MASS INDEX: 36.17 KG/M2 | DIASTOLIC BLOOD PRESSURE: 69 MMHG | WEIGHT: 272.94 LBS | HEIGHT: 73 IN | SYSTOLIC BLOOD PRESSURE: 116 MMHG | HEART RATE: 84 BPM

## 2020-03-17 DIAGNOSIS — G47.30 SLEEP APNEA, UNSPECIFIED TYPE: Primary | ICD-10-CM

## 2020-03-17 PROCEDURE — 3074F PR MOST RECENT SYSTOLIC BLOOD PRESSURE < 130 MM HG: ICD-10-PCS | Mod: CPTII,S$GLB,, | Performed by: PSYCHIATRY & NEUROLOGY

## 2020-03-17 PROCEDURE — 99204 OFFICE O/P NEW MOD 45 MIN: CPT | Mod: S$GLB,,, | Performed by: PSYCHIATRY & NEUROLOGY

## 2020-03-17 PROCEDURE — 99999 PR PBB SHADOW E&M-EST. PATIENT-LVL III: ICD-10-PCS | Mod: PBBFAC,,, | Performed by: PSYCHIATRY & NEUROLOGY

## 2020-03-17 PROCEDURE — 3074F SYST BP LT 130 MM HG: CPT | Mod: CPTII,S$GLB,, | Performed by: PSYCHIATRY & NEUROLOGY

## 2020-03-17 PROCEDURE — 3078F DIAST BP <80 MM HG: CPT | Mod: CPTII,S$GLB,, | Performed by: PSYCHIATRY & NEUROLOGY

## 2020-03-17 PROCEDURE — 3078F PR MOST RECENT DIASTOLIC BLOOD PRESSURE < 80 MM HG: ICD-10-PCS | Mod: CPTII,S$GLB,, | Performed by: PSYCHIATRY & NEUROLOGY

## 2020-03-17 PROCEDURE — 99999 PR PBB SHADOW E&M-EST. PATIENT-LVL III: CPT | Mod: PBBFAC,,, | Performed by: PSYCHIATRY & NEUROLOGY

## 2020-03-17 PROCEDURE — 3008F BODY MASS INDEX DOCD: CPT | Mod: CPTII,S$GLB,, | Performed by: PSYCHIATRY & NEUROLOGY

## 2020-03-17 PROCEDURE — 3008F PR BODY MASS INDEX (BMI) DOCUMENTED: ICD-10-PCS | Mod: CPTII,S$GLB,, | Performed by: PSYCHIATRY & NEUROLOGY

## 2020-03-17 PROCEDURE — 99204 PR OFFICE/OUTPT VISIT, NEW, LEVL IV, 45-59 MIN: ICD-10-PCS | Mod: S$GLB,,, | Performed by: PSYCHIATRY & NEUROLOGY

## 2020-03-17 NOTE — PATIENT INSTRUCTIONS
SLEEP LAB ([facility] or [facility]) will contact you to schedulethe sleep study. Their number is [phone] (ext 2). Please call them if you do not hear from them in 2 weeks from now.  The [facility], [place], operated by [facility] is located on 7th floor of the [facility]; [place] lab is located in [facility].    SLEEP CLINIC (my assistant) will call you when the sleep study results are ready - if you have not heard from us by 2 weeks from the date of the study, please call [phone] (ext 1) or you can use [facility] to contact me.    You are advised to abstain from driving should you feel sleepy or drowsy.

## 2020-03-17 NOTE — LETTER
March 31, 2020      Bettina Castro MD  [address]  [place] [address]           [id number]  [address]  [facility] [address]  Phone: [phone]          Patient: Dewey Melendez   MR Number: [id number]   YOB: 1980   Date of Visit: 3/17/2020       Dear Dr. Bettina Castro:    Thank you for referring Dewey Melendez to me for evaluation. Attached you will find relevant portions of my assessment and plan of care.    If you have questions, please do not hesitate to call me. I look forward to following Dewey Melendez along with you.    Sincerely,    Christen Carbajal MD    Enclosure  CC:  No Recipients    If you would like to receive this communication electronically, please contact [email or web address] or [phone] to request more information on [employer] access.    For providers and/or their staff who would like to refer a patient to [facility], please contact us through our one-stop-shop provider referral line, [facility] [place], operated by [facility], at [phone].    If you feel you have received this communication in error or would no longer like to receive these types of communications, please e-mail [email or web address]

## 2020-03-17 NOTE — PROGRESS NOTES
Dewey Melendez  was seen at the request of  Bettina Castro MD for sleep evaluation.    03/17/2020 INITIAL HISTORY OF PRESENT ILLNESS:  Dewey Melendez is a 39 y.o. male is here to be evaluated for a sleep disorder.       CHIEF COMPLAINT:      The patient's complaints include some excessive daytime sleepiness, snoring,  witnessed breathing pauses,  gasping for air in sleep and interrupted sleep since a couple years back  .  He can take a nap during the day, but he does not feel that slows him down. Driving is not a concern for him.    Dewey Melendez denied  excessive daytime fatigue.    The patient never had tonsillectomy, adenoidectomy or UPPP    Denies difficulty falling and staying asleep.    He has gained some weight over the last 5 years.     Denies symptoms concerning for parasomnia except for occasional somniloquy.  he Denies cataplexy, sleep paralysis, hallucinations surrounding sleep time.     Dewey Melendez denied symptoms concerning for RLS.      EPWORTH SLEEPINESS SCALE 3/17/2020   Sitting and reading 1   Watching TV 2   Sitting, inactive in a public place (e.g. a theatre or a meeting) 1   As a passenger in a car for an hour without a break 1   Lying down to rest in the afternoon when circumstances permit 2   Sitting and talking to someone 1   Sitting quietly after a lunch without alcohol 1   In a car, while stopped for a few minutes in traffic 0   Total score 9       PHQ9 3/17/2020   Little interest or pleasure in doing things: Not at all   Feeling down, depressed or hopeless: Not at all   Trouble falling asleep, staying asleep, or sleeping too much: Several days   Feeling tired or having little energy: Several days   Poor appetite or overeating: Several days   Feeling bad about yourself- or that you are a failure or have let yourself or family down Not at all   Trouble concentrating on things, such as reading the newspaper or watching television: Several days   Moving or  speaking so slowly that other people could have noticed. Or the opposite- being so fidgety or restless that you have been moving around a lot more than usual: Several days   Thoughts that you would be better off dead or hurting yourself in some way: Not at all   If you indicated you have experienced any of the aforementioned problems, how difficult have these problems made it for you to do your work, take care of things at home or get along with other people? Not difficult at all   Total Score 5     GAD7 3/17/2020   1. Feeling nervous, anxious, or on edge? 1   2. Not being able to stop or control worrying? 0   3. Worrying too much about different things? 1   4. Trouble relaxing? 0   5. Being so restless that it is hard to sit still? 1   6. Becoming easily annoyed or irritable? 1   7. Feeling afraid as if something awful might happen? 1   8. If you checked off any problems, how difficult have these problems made it for you to do your work, take care of things at home, or get along with other people? 0   RADHA-7 Score 5         SLEEP ROUTINE AND LIFESTYLE 03/17/2020 :    Sleep Clinic New Patient 3/17/2020   What time do you go to bed on a week day? (Give a range) Between 9:30 or 10:30 pm   What time do you go to bed on a day off? (Give a range) Between 11:00pm or midnight   How long does it take you to fall asleep? (Give a range) Maybe 10-20 minutes   On average, how many times per night do you wake up? About 2 to 3 times   How long does it take you to fall back into sleep? (Give a range) About 5 minutes   What time do you wake up to start your day on a week day? (Give a range) 4:30 am   What time do you wake up to start your day on a day off? (Give a range) About 8 am   What time do you get out of bed? (Give a range) Depends on what I have to do that day. I'll get up as soon as I wake up but if I don't have anything planned I'll lay in bed maybe an hour or so.   On average, how many hours do you sleep? About 6 hrs   On  average, how many naps do you take per day? Maybe one if I'm really tired.   Rate your sleep quality from 0 to 5 (0-poor, 5-great). 3   Have you experienced:  N/a   How much weight have you lost or gained (in lbs.) in the last year? 0   On average, how many times per night do you go to the bathroom?  1 time.   Have you ever had a sleep study/CPAP machine/surgery for sleep apnea? No   Have you ever had a CPAP machine for sleep apnea? No   Have you ever had surgery for sleep apnea? No       Sleep Clinic ROS  3/17/2020   Difficulty breathing through the nose?  Yes   Sore throat or dry mouth in the morning? No   Irregular or very fast heart beat?  Sometimes   Shortness of breath?  Sometimes   Acid reflux? Sometimes   Body aches and pains?  Sometimes   Morning headaches? Sometimes   Dizziness? Sometimes   Mood changes?  No   Do you exercise?  Sometimes   Do you feel like moving your legs a lot?  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    Family history of ischemic heart disease 07/11/2015    Type 2 diabetes mellitus without complication, without long-term current use of insulin 07/11/2015    Essential hypertension 01/30/2019     Resolved Ambulatory Problems     Diagnosis Date Noted    Other abnormal glucose 12/26/2012    Viral URI 10/15/2014    Leukocytosis 07/11/2015     Past Medical History:   Diagnosis Date    Hypertension                 PAST SURGICAL HISTORY:    Past Surgical History:  "  Procedure Laterality Date    NO PAST SURGERIES           FAMILY HISTORY:                Family History   Problem Relation Age of Onset    Diabetes Mother     Cataracts Mother     Blindness Maternal Grandmother     Cataracts Maternal Grandmother     Glaucoma Maternal Grandmother        SOCIAL HISTORY:          Tobacco:   Social History     Tobacco Use   Smoking Status Never Smoker   Smokeless Tobacco Never Used       alcohol use:    Social History     Substance and Sexual Activity   Alcohol Use Yes    Comment: Very seldom beer or mixed drink.                   ALLERGIES:  Review of patient's allergies indicates:  No Known Allergies    CURRENT MEDICATIONS:    Current Outpatient Medications   Medication Sig Dispense Refill    amLODIPine (NORVASC) 10 MG tablet Take 1 tablet (10 mg total) by mouth once daily. 90 tablet 1    fluticasone propionate (FLONASE) 50 mcg/actuation nasal spray SHAKE LIQUID AND USE 2 SPRAYS(100 MCG) IN EACH NOSTRIL EVERY DAY 16 g 0    triamterene-hydrochlorothiazide 37.5-25 mg (DYAZIDE) 37.5-25 mg per capsule Take 1 capsule by mouth every morning. 90 capsule 1    aspirin (ECOTRIN) 81 MG EC tablet Take 81 mg by mouth once daily.        hydrocodone-chlorpheniramine (TUSSIONEX) 10-8 mg/5 mL suspension Take 5 mLs by mouth nightly as needed for Cough or Congestion. (Patient not taking: Reported on 3/17/2020) 60 mL 0    loratadine (CLARITIN) 10 mg tablet Take 1 tablet (10 mg total) by mouth once daily. (Patient not taking: Reported on 3/17/2020) 60 tablet 0    metFORMIN (GLUCOPHAGE-XR) 500 MG XR 24hr tablet Take 1 tablet (500 mg total) by mouth daily with breakfast. (Patient not taking: Reported on 3/17/2020) 90 tablet 1     No current facility-administered medications for this visit.                           PHYSICAL EXAM:  /69 (BP Location: Right arm, Patient Position: Sitting, BP Method: Large (Automatic))   Pulse 84   Ht 6' 1" (1.854 m)   Wt 123.8 kg (272 lb 14.9 oz)   BMI " "36.01 kg/m²   GENERAL: Normal development, well groomed.  HEENT:   HEENT:  Conjunctivae are non-erythematous; Pupils equal, round, and reactive to light; Nose is symmetrical; Nasal mucosa is pink and moist; Septum is midline; Inferior turbinates are normal; Nasal airflow is normal; Posterior pharynx is pink; Modified Mallampati:III-IV; Posterior palate is low; Tonsils not visualized; Uvula is elongated;Tongue is enlarged; Dentition is fair; No TMJ tenderness; Jaw opening and protrusion without click and without discomfort.  NECK: Supple. Neck circumference is 18 inches. No thyromegaly. No palpable nodes.     SKIN: On face and neck: No abrasions, no rashes, no lesions.  No subcutaneous nodules are palpable.  RESPIRATORY: Chest is clear to auscultation.  Normal chest expansion and non-labored breathing at rest.  CARDIOVASCULAR: Normal S1, S2.  No murmurs, gallops or rubs. No carotid bruits bilaterally.  No edema. No clubbing. No cyanosis.    NEURO: Oriented to time, place and person. Normal attention span and concentration. Gait normal.    PSYCH: Affect is full. Mood is normal  MUSCULOSKELETAL: Moves 4 extremities. Gait normal.         Using [facility]: yes      ASSESSMENT:    1. Sleep Apnea NEC. The patient symptomatically has  excessive daytime sleepiness, snoring,  witnessed breathing pauses, gasping for air in sleep and interrupted sleep  with exam findings of "a crowded oral airway and elevated body mass index. The patient has medical co-morbidities of diabetes and hypertension,  which can be worsened by DALTON. This warrants further investigation for possible obstructive sleep apnea.          PLAN:    Diagnostic: Polysomnogram in lab. The nature of this procedure and its indication was discussed with the patient. he would  like to come discuss PSG results.    Weight loss strategies were discussed in detail         More than 15 minutes of this 30 minutes visit was spent in counseling: and coordination of care.     " during our discussion today, we talked about the etiology of  DALTON as well as the potential ramifications of untreated sleep apnea, which could include daytime sleepiness, hypertension, heart disease and/or stroke.  We discussed potential treatment options, which could include weight loss, body positioning, continuous positive airway pressure (CPAP), or referral for surgical consideration. Meanwhile, he  is urged to avoid supine sleep, weight gain and alcoholic beverages since all of these can worsen DALTON.     Precautions: The patient was advised to abstain from driving should he feel sleepy or drowsy.    Follow up: MD/NP  after the sleep study has been completed.     Thank you for allowing me the opportunity to participate in the care of your patient.    This visit summary will be sent to referring provider via [employer]

## 2020-04-09 ENCOUNTER — PATIENT MESSAGE (OUTPATIENT)
Dept: DIABETES | Facility: CLINIC | Age: 40
End: 2020-04-09

## 2020-04-13 ENCOUNTER — CLINICAL SUPPORT (OUTPATIENT)
Dept: DIABETES | Facility: CLINIC | Age: 40
End: 2020-04-13
Payer: COMMERCIAL

## 2020-04-13 DIAGNOSIS — E11.9 TYPE 2 DIABETES MELLITUS WITHOUT COMPLICATION, WITHOUT LONG-TERM CURRENT USE OF INSULIN: ICD-10-CM

## 2020-04-13 PROCEDURE — G0108 PR DIAB MANAGE TRN  PER INDIV: ICD-10-PCS | Mod: S$GLB,,, | Performed by: DIETITIAN, REGISTERED

## 2020-04-13 PROCEDURE — G0108 DIAB MANAGE TRN  PER INDIV: HCPCS | Mod: S$GLB,,, | Performed by: DIETITIAN, REGISTERED

## 2020-04-13 NOTE — PROGRESS NOTES
Diabetes Care Specialist Telehealth Visit Note    This visit was conducted using Telehealth/Telephonic Technology.  It was in the patient's best interest to receive diabetes self-management education and support services in this format to prevent the exposure to COVID-19.        Diabetes Education Telephone visit conducted due to Covid-19 situation to limit risk of exposure to patient. Patient verified their name and date of birth.   Diabetes Education  Author: Kelly Lovell RD  Date: 4/13/2020    Diabetes Care Management Summary  Diabetes Education Record Assessment/Progress: Initial  Current Diabetes Risk Level: Low   Diabetes Type  Diabetes Type : Pre-Diabetes    Diabetes History  Diabetes Diagnosis: 0-1 year  Current Treatment: Diet, Oral Medication  Reviewed Problem List with Patient: Yes    Health Maintenance was reviewed today with patient. Discussed with patient importance of routine eye exams, foot exams/foot care, blood work (i.e.: A1c, microalbumin, and lipid), dental visits, yearly flu vaccine, and pneumonia vaccine as indicated by PCP. Patient verbalized understanding.     Health Maintenance Topics with due status: Not Due       Topic Last Completion Date    TETANUS VACCINE 02/13/2019    Lipid Panel 01/31/2020     There are no preventive care reminders to display for this patient.    Nutrition  Meal Planning: skipping meals, drinks regular soda, water, eats out often, snacks between meal  What type of beverages do you drink?: juice, milk, water, regular soda/tea    Monitoring   Self Monitoring : none; just relying on routine 3 month blood work  Blood Glucose Logs: No  Do you use a personal continuous glucose monitor?: No  In the last month, how often have you had a low blood sugar reaction?: never  Can you tell when your blood sugar is too high?: no    Exercise   Exercise Type: none(may occ walk but limited due to covid19 crisis)    Current Diabetes Treatment   Current Treatment: Diet, Oral  Medication    Social History  Preferred Learning Method: Face to Face, Group Education, Reading Materials  Primary Support: Self  Educational Level: High School  Occupation: ship [employer]  Smoking Status: Never a Smoker  Alcohol Use: Weekly  Barriers to Change: None  Learning Challenges : None  Readiness to Learn   Readiness to Learn : Acceptance  Cultural Influences  Cultural Influences: No    Diabetes Education Assessment/Progress  Diabetes Disease Process (diabetes disease process and treatment options): Discussion, Individual Session, Comprehends Key Points  Nutrition (Incorporating nutritional management into one's lifestyle): Discussion, Individual Session, Comprehends Key Points, Written Materials Provided  -pt states he is still drinking an occ SSB as well as juice. Pt admits staying on diet harder since stay at home. Discussed meal strategies and setting eating times to help prevent excessive snacking.  -Discussed carb vs non-carb foods, appropriate amount of carbs to have at meals/snacks and acceptiable serving sizes of individual carb items. Suggested trying carb tracking apps such as CarbManager  - Instructed on appropriate label reading and serving sizes of specific carb containing foods., portion control (hand) and using the plate method of meal planning.   -Encouraged reducing high sodium foods reji when eating out; low sodium guide provided. Discussed lower fat intake; reducing solid fats and eat lean meats  -To assist w both slow weight loss and restricting carb intake, suggessted pt  aim for 3 evenly-spaced meals with 45-60 gm carb/meal and 0-15 gm at snacks of approx 1800 cals.    Physical Activity (incorporating physical activity into one's lifestyle): Discussion, Individual Session, Comprehends Key Points  -pt states exercise is limited at present due to covid-19 concerns.   -Discussed benefits of physical activity on BG control as well as types of activities that fit into social distancing  "guidelines.  -encouraged pt to start a walking program for a total of 150 minutes per week while  keeping 3 exercise components in mind: Frequency- 3-5x/wk, Duration- 30 min, Intensity- can say name but "not sing a song"    Medications (states correct name, dose, onset, peak, duration, side effects & timing of meds): Discussion  Monitoring (monitoring blood glucose/other parameters & using results): Discussion  Acute Complications (preventing, detecting, and treating acute complications): Discussion, Individual Session  Chronic Complications (preventing, detecting, and treating chronic complications): Discussion, Individual Session  Clinical (diabetes, other pertinent medical history, and relevant comorbidities reviewed during visit): Discussion  Cognitive (knowledge of self-management skills, functional health literacy): Discussion  -verbalized need to stay aware and attentive to self-care    Behavioral (readiness for change, lifestyle practices, self-care behaviors): Discussion  -Appears  motivated to make recommended changes    Goals  Patient has selected/evaluated goals during today's session: Yes, selected  Healthy Eating: Set(avoid SSB and fruit juice; distribute carbs into 3 meals/day, limited to 45-60 g carb/meal)  Start Date: 04/13/20  Target Date: 05/13/20  Physical Activity: Set(PA 3-5x/wk, 1/2 hr duration)  Start Date: 04/13/20  Target Date: 05/13/20    Diabetes Care Plan/Intervention  Education Plan/Intervention: Individual Follow-Up DSMT(follow up post covid crisis)    Diabetes Meal Plan  Restrictions: Low Sodium, Restricted Carbohydrate, Low Fat  Carbohydrate Per Meal: 45-60g  Carbohydrate Per Snack : 15-20g    Today's Self-Management Care Plan was developed with the patient's input and is based on barriers identified during today's assessment.    The long and short-term goals in the care plan were written with the patient/caregiver's input. The patient has agreed to work toward these goals to improve " his overall diabetes control.      The patient received a copy of today's self-management plan and verbalized understanding of the care plan, goals, and all of today's instructions.      The patient was encouraged to communicate with his physician and care team regarding his condition(s) and treatment.  I provided the patient with my contact information today and encouraged him to contact me via phone or patient portal as needed.     Education Units of Time   Time Spent: 30 min

## 2020-04-14 NOTE — PROGRESS NOTES
Digital Medicine: Health  Follow-Up    Called to follow up with patient, current BP average 118/79 is at goal <130/<80. Patient is still working however taking precautions like social distancing and hand washing.      Reminded patient to continue regular BP readings, he agreed.           Follow Up  Follow-up reason(s): reading review      Readings are missing.   patient reminder needed.      INTERVENTION(S)  encouragement/support, denied resources and denied questions    PLAN  patient amenable to changes and additional monitoring needed    Patient denied questions/concerns, goal setting, and resources.   Patient agreed to resume regular readings, [facility] care team will continue to monitor and I will follow up in 4 weeks.           There are no preventive care reminders to display for this patient.    Last 5 Patient Entered Readings                                      Current 30 Day Average: 118/79     Recent Readings 3/20/2020 3/20/2020 3/18/2020 1/21/2020 1/6/2020    SBP (mmHg) 120 115 115 132 127    DBP (mmHg) 77 80 80 85 89    Pulse 95 96 96 99 88                  Screenings    SDOH

## 2020-05-13 ENCOUNTER — OFFICE VISIT (OUTPATIENT)
Dept: FAMILY MEDICINE | Facility: CLINIC | Age: 40
End: 2020-05-13
Payer: COMMERCIAL

## 2020-05-13 DIAGNOSIS — T63.441A BEE STING REACTION, ACCIDENTAL OR UNINTENTIONAL, INITIAL ENCOUNTER: Primary | ICD-10-CM

## 2020-05-13 PROCEDURE — 99213 PR OFFICE/OUTPT VISIT, EST, LEVL III, 20-29 MIN: ICD-10-PCS | Mod: 95,,, | Performed by: PHYSICIAN ASSISTANT

## 2020-05-13 PROCEDURE — 99213 OFFICE O/P EST LOW 20 MIN: CPT | Mod: 95,,, | Performed by: PHYSICIAN ASSISTANT

## 2020-05-13 NOTE — PROGRESS NOTES
Patient Name: Dewey Melendez    : 1980  MRN: [id number]    The patient location is: home  The chief complaint leading to consultation is: Bee sting  Visit type: Virtual visit with synchronous audio and video  Total time spent with patient: 11 minutes  Each patient to whom he or she provides medical services by telemedicine is:  (1) informed of the relationship between the physician and patient and the respective role of any other health care provider with respect to management of the patient; and (2) notified that he or she may decline to receive medical services by telemedicine and may withdraw from such care at any time.    Subjective:  Dewey Munguia is a 39 y.o. male who presents today via virtual visit for:    Chief Complaint   Patient presents with    Insect Bite       HPI  Patient has multiple medical diagnoses as listed below in the history. Patient is new to me, but known to the clinic. He complains of a bee sting on his left hand for one day. He reports seeing the bee land on his hand briefly and immediately feeling pain. He does not have history of anaphylactic reaction. He has been stung in the past with local site reaction. He reports local site swelling, mild redness and warmth. He has applied ice packs with some relief. He has not taken any medications. He denies fever, difficulty swallowing, angioedema, dyspnea, n/v/d, palpitations or chest pain. No numbness or tingling. He does not visualize any remnants of the stinger left behind at the affected site.     Past Medical History  Past Medical History:   Diagnosis Date    Dyslipidemia with elevated low density lipoprotein cholesterol and abnormally low high density lipoprotein cholesterol 2014    Hypertension     Type 2 diabetes mellitus without complication, without long-term current use of insulin        Past Surgical History  Past Surgical History:   Procedure Laterality Date    NO PAST SURGERIES         Family History  Family  History   Problem Relation Age of Onset    Diabetes Mother     Cataracts Mother     Blindness Maternal Grandmother     Cataracts Maternal Grandmother     Glaucoma Maternal Grandmother        Social History  Social History     Socioeconomic History    Marital status: Single     Spouse name: Not on file    Number of children: Not on file    Years of education: Not on file    Highest education level: Not on file   Occupational History    Not on file   Social Needs    Financial resource strain: Not on file    Food insecurity:     Worry: Not on file     Inability: Not on file    Transportation needs:     Medical: Not on file     Non-medical: Not on file   Tobacco Use    Smoking status: Never Smoker    Smokeless tobacco: Never Used   Substance and Sexual Activity    Alcohol use: Yes     Comment: Very seldom beer or mixed drink.    Drug use: No    Sexual activity: Not on file   Lifestyle    Physical activity:     Days per week: Not on file     Minutes per session: Not on file    Stress: Not on file   Relationships    Social connections:     Talks on phone: Not on file     Gets together: Not on file     Attends Mandaen service: Not on file     Active member of club or organization: Not on file     Attends meetings of clubs or organizations: Not on file     Relationship status: Not on file   Other Topics Concern    Not on file   Social History Narrative    Not on file       Current Medications  Current Outpatient Medications on File Prior to Visit   Medication Sig Dispense Refill    amLODIPine (NORVASC) 10 MG tablet Take 1 tablet (10 mg total) by mouth once daily. 90 tablet 1    aspirin (ECOTRIN) 81 MG EC tablet Take 81 mg by mouth once daily.        fluticasone propionate (FLONASE) 50 mcg/actuation nasal spray SHAKE LIQUID AND USE 2 SPRAYS(100 MCG) IN EACH NOSTRIL EVERY DAY 16 g 0    hydrocodone-chlorpheniramine (TUSSIONEX) 10-8 mg/5 mL suspension Take 5 mLs by mouth nightly as needed for Cough  or Congestion. (Patient not taking: Reported on 3/17/2020) 60 mL 0    loratadine (CLARITIN) 10 mg tablet Take 1 tablet (10 mg total) by mouth once daily. (Patient not taking: Reported on 3/17/2020) 60 tablet 0    metFORMIN (GLUCOPHAGE-XR) 500 MG XR 24hr tablet Take 1 tablet (500 mg total) by mouth daily with breakfast. (Patient not taking: Reported on 3/17/2020) 90 tablet 1    triamterene-hydrochlorothiazide 37.5-25 mg (DYAZIDE) 37.5-25 mg per capsule Take 1 capsule by mouth every morning. 90 capsule 1     No current facility-administered medications on file prior to visit.        Allergies   Review of patient's allergies indicates:  No Known Allergies      ROS  Review of Systems   Constitutional: Negative for chills and fever.   HENT: Negative for facial swelling and trouble swallowing.    Eyes: Negative for discharge and visual disturbance.   Respiratory: Negative for chest tightness and wheezing.    Cardiovascular: Negative for chest pain and palpitations.   Gastrointestinal: Negative for nausea and vomiting.   Musculoskeletal: Negative for arthralgias, joint swelling and neck pain.   Skin:        Positive bee sting   Neurological: Negative for weakness and headaches.   Hematological: Negative for adenopathy.         Objective:    There were no vitals taken for this visit.    Physical Exam   Eyes: Conjunctivae, EOM and lids are normal.   Neck: Normal range of motion.   Pulmonary/Chest: Effort normal.   Musculoskeletal:        Left hand: He exhibits swelling.   Neurological: He is alert.       Assessment/Plan:  Dewey Melendez is a 39 y.o. male who presents today for :    Dewey Munguia was seen today for insect bite.    Diagnoses and all orders for this visit:    Bee sting reaction, accidental or unintentional, initial encounter  -Local site reaction, no signs or symptoms of systemic reaction or anaphylaxis.   -Continue with cold compresses.  -Advised non-drowsy OTC antihistamine and ibuprofen to ease  discomfort.  -Discussed with patient this is a self-limiting reaction, as well as the importance of monitoring affected site for worsening symptoms or symptoms consistent with systemic response.   -Discussed ED precautions.   -Counseled regarding risks, benefits, and limitations of medications.  -Sent patient with informational material about diagnosis.  -Patient gave verbal understanding and agreement of plan.       Encouraged to call/return to clinic if symptoms persist or worsen    Chioma Galvan PA-C  [facility]/ Internal Med

## 2020-05-22 ENCOUNTER — HOSPITAL ENCOUNTER (OUTPATIENT)
Dept: SLEEP MEDICINE | Facility: HOSPITAL | Age: 40
Discharge: HOME | End: 2020-05-22
Attending: FAMILY MEDICINE
Payer: COMMERCIAL

## 2020-05-22 DIAGNOSIS — G47.30 SLEEP APNEA, UNSPECIFIED TYPE: ICD-10-CM

## 2020-05-22 DIAGNOSIS — G47.33 OSA (OBSTRUCTIVE SLEEP APNEA): ICD-10-CM

## 2020-05-22 PROCEDURE — 95800 SLP STDY UNATTENDED: CPT

## 2020-05-22 NOTE — PATIENT INSTRUCTIONS
Mr. Melendez is instructed to return the device back tomorrow morning at 10:00 AM in the Emergency room registration desk.

## 2020-05-22 NOTE — PROGRESS NOTES
The patient ID was verified. He  was instructed on how to turn the Home Sleep Testing device on and off, how to apply the sensors. He  was encouraged to sleep on supine position and must have 6 hours of sleep. The patient was instructed not to get the device wet and return it back to us. All questions were answered prior to patient leaving. This is curb side/patient contact.

## 2020-05-27 ENCOUNTER — PATIENT MESSAGE (OUTPATIENT)
Dept: SLEEP MEDICINE | Facility: CLINIC | Age: 40
End: 2020-05-27

## 2020-05-27 ENCOUNTER — TELEPHONE (OUTPATIENT)
Dept: SLEEP MEDICINE | Facility: CLINIC | Age: 40
End: 2020-05-27

## 2020-05-27 PROCEDURE — 95800 PR SLEEP STUDY, UNATTENDED, RECORD HEART RATE/O2 SAT/RESP ANAL/SLEEP TIME: ICD-10-PCS | Mod: 26,,, | Performed by: PSYCHIATRY & NEUROLOGY

## 2020-05-27 PROCEDURE — 95800 SLP STDY UNATTENDED: CPT | Mod: 26,,, | Performed by: PSYCHIATRY & NEUROLOGY

## 2020-05-27 NOTE — TELEPHONE ENCOUNTER
CB      Please call the patient to inform re: recent sleep study was positive for obstructive sleep apnea    I can order the machine (see back in 7-8 weeks), or can schedule to discuss results.    PLEASE REFER THE PATIENT TO  [facility] to see the report and my explanations.      Please forward the message back to me if you could not reach the patient and could not leave a voicemail.    TY!

## 2020-05-27 NOTE — PROCEDURES
PHYSICIAN INTERPRETATION AND COMMENTS: Findings are consistent with moderate, obstructive sleep apnea (DALTON)  (G47.33), by overall AHI (apnea hypopnea index). However, findings on this study suggest that the degree of sleep disordered  breathing is in the severe range, when RDI is measured. This study was technically adequate to allow for interpretation.  CLINICAL HISTORY: 39 year old male presented with: 18 inch neck, BMI of 36, an [place] sleepiness score of 8, history of  hypertension, diabetes and symptoms of nocturnal snoring and witnessed apneas. Based on the clinical history, the patient has a high  pre-test probability of having severe DALTON.  SLEEP STUDY FINDINGS: Patient underwent a one night Home Sleep Test and by behavioral criteria, slept for approximately  5.8 hours, with a sleep latency of 6 minutes and a sleep efficiency of 83.1%. Moderate sleep disordered breathing (AHI=25) is  noted based on a 4% hypopnea desaturation criteria. RDI was in severe range (31)The patient slept supine 63.5% of the night based  on valid recording time of 5.82 hours. The apneas/hypopneas are accompanied by minimal oxygen desaturation (percent time below  90% SpO2: 3.8%, Min SpO2: 78.3%). The average desaturation across all sleep disordered breathing events is 3.9%. Snoring  occurs for 38.2% (30 dB) of the study, 37.9% is very loud. The mean pulse rate is 76 BPM, with infrequent pulse rate variability (22  events with >= 6 BPM increase/decrease per hour).  TREATMENT CONSIDERATIONS: Consider trial of Auto-titrating CPAP 6-20 cm, mask of patient's choice, and heated  humidification. If patient has difficulty with CPAP adherence or ongoing DALTON symptoms or despite CPAP adherence, then consider  an in-lab titration sleep study in order to determine optimal fixed CPAP setting. Alternatively consider oral appliance fitted by a  dentist specializing in these devices, or surgical consultation for uvulopalatopharyngoplasty (UPPP) for  "treatment of obstructive sleep  apnea.  DISEASE MANAGEMENT CONSIDERATIONS: Definitive treatment for DALTON is recommended. Consider Sleep Clinic  referral for DALTON management.    _______________________      See imported Detailed Sleep Study Reports with raw data in  "Chart Review" under the "Media tab".      (This Sleep Study was interpreted by a Board Certified Sleep Specialist who conducted an epoch-by-epoch review of the entire raw data recording.)     (The indication for this sleep study was reviewed and deemed appropriate by [facility] Practice Parameters or other reasons by a Board Certified Sleep Specialist.)    "

## 2020-05-28 ENCOUNTER — TELEPHONE (OUTPATIENT)
Dept: SLEEP MEDICINE | Facility: CLINIC | Age: 40
End: 2020-05-28

## 2020-05-28 NOTE — TELEPHONE ENCOUNTER
----- Message from Jasvir Holland MA sent at 5/27/2020  5:13 PM CDT -----  CB        Please call the patient to inform re: recent sleep study was positive for obstructive sleep apnea     I can order the machine (see back in 7-8 weeks), or can schedule to discuss results.     PLEASE REFER THE PATIENT TO  [facility] to see the report and my explanations.        Please forward the message back to me if you could not reach the patient and could not leave a voicemail.     TY!

## 2020-05-29 ENCOUNTER — PATIENT MESSAGE (OUTPATIENT)
Dept: FAMILY MEDICINE | Facility: CLINIC | Age: 40
End: 2020-05-29

## 2020-05-29 DIAGNOSIS — I10 ESSENTIAL HYPERTENSION: ICD-10-CM

## 2020-05-29 RX ORDER — AMLODIPINE BESYLATE 10 MG/1
10 TABLET ORAL DAILY
Qty: 30 TABLET | Refills: 0 | Status: SHIPPED | OUTPATIENT
Start: 2020-05-29 | End: 2020-07-21

## 2020-05-29 NOTE — TELEPHONE ENCOUNTER
Please contact patient. Patient will need to schedule an appointment for future refills.     I have not seen this patient in 3 years

## 2020-06-01 ENCOUNTER — OFFICE VISIT (OUTPATIENT)
Dept: SLEEP MEDICINE | Facility: CLINIC | Age: 40
End: 2020-06-01
Payer: COMMERCIAL

## 2020-06-01 DIAGNOSIS — G47.33 OSA (OBSTRUCTIVE SLEEP APNEA): Primary | ICD-10-CM

## 2020-06-01 PROCEDURE — 99213 PR OFFICE/OUTPT VISIT, EST, LEVL III, 20-29 MIN: ICD-10-PCS | Mod: 95,,, | Performed by: PSYCHIATRY & NEUROLOGY

## 2020-06-01 PROCEDURE — 99213 OFFICE O/P EST LOW 20 MIN: CPT | Mod: 95,,, | Performed by: PSYCHIATRY & NEUROLOGY

## 2020-06-01 NOTE — PROGRESS NOTES
The patient location is: home  The chief complaint leading to consultation is: Sleep study results follow up  Visit type: audiovisual  Total time spent with patient: 30 min  Each patient to whom he or she provides medical services by telemedicine is:  (1) informed of the relationship between the physician and patient and the respective role of any other health care provider with respect to management of the patient; and (2) notified that he or she may decline to receive medical services by telemedicine and may withdraw from such care at any time.    INTERVAL HISTORY:    06/01/2020:   The patient has not presented any new complaints since the previous visit. He comes to discuss his HST - positive for moderate-severe Obstructive sleep apnea (DALTON).   Dewey Melendez still reports excessive daytime sleepiness, snoring,  witnessed breathing pauses,  gasping for air in sleep and interrupted sleep.     EPWORTH SLEEPINESS SCALE 3/17/2020   Sitting and reading 1   Watching TV 2   Sitting, inactive in a public place (e.g. a theatre or a meeting) 1   As a passenger in a car for an hour without a break 1   Lying down to rest in the afternoon when circumstances permit 2   Sitting and talking to someone 1   Sitting quietly after a lunch without alcohol 1   In a car, while stopped for a few minutes in traffic 0   Total score 9           03/17/2020 INITIAL HISTORY OF PRESENT ILLNESS:  Dewey Melendez is a 39 y.o. male is here to be evaluated for a sleep disorder.       CHIEF COMPLAINT:      The patient's complaints include some excessive daytime sleepiness, snoring,  witnessed breathing pauses,  gasping for air in sleep and interrupted sleep since a couple years back  .  He can take a nap during the day, but he does not feel that slows him down. Driving is not a concern for him.    Dewey Melendez denied  excessive daytime fatigue.    The patient never had tonsillectomy, adenoidectomy or UPPP    Denies difficulty  falling and staying asleep.    He has gained some weight over the last 5 years.     Denies symptoms concerning for parasomnia except for occasional somniloquy.  he Denies cataplexy, sleep paralysis, hallucinations surrounding sleep time.     Dewey Melendez denied symptoms concerning for RLS.      EPWORTH SLEEPINESS SCALE 3/17/2020   Sitting and reading 1   Watching TV 2   Sitting, inactive in a public place (e.g. a theatre or a meeting) 1   As a passenger in a car for an hour without a break 1   Lying down to rest in the afternoon when circumstances permit 2   Sitting and talking to someone 1   Sitting quietly after a lunch without alcohol 1   In a car, while stopped for a few minutes in traffic 0   Total score 9       PHQ9 3/17/2020   Little interest or pleasure in doing things: Not at all   Feeling down, depressed or hopeless: Not at all   Trouble falling asleep, staying asleep, or sleeping too much: Several days   Feeling tired or having little energy: Several days   Poor appetite or overeating: Several days   Feeling bad about yourself- or that you are a failure or have let yourself or family down Not at all   Trouble concentrating on things, such as reading the newspaper or watching television: Several days   Moving or speaking so slowly that other people could have noticed. Or the opposite- being so fidgety or restless that you have been moving around a lot more than usual: Several days   Thoughts that you would be better off dead or hurting yourself in some way: Not at all   If you indicated you have experienced any of the aforementioned problems, how difficult have these problems made it for you to do your work, take care of things at home or get along with other people? Not difficult at all   Total Score 5     GAD7 3/17/2020   1. Feeling nervous, anxious, or on edge? 1   2. Not being able to stop or control worrying? 0   3. Worrying too much about different things? 1   4. Trouble relaxing? 0   5.  Being so restless that it is hard to sit still? 1   6. Becoming easily annoyed or irritable? 1   7. Feeling afraid as if something awful might happen? 1   8. If you checked off any problems, how difficult have these problems made it for you to do your work, take care of things at home, or get along with other people? 0   RADHA-7 Score 5         SLEEP ROUTINE AND LIFESTYLE 06/01/2020 :    Sleep Clinic New Patient 3/17/2020   What time do you go to bed on a week day? (Give a range) Between 9:30 or 10:30 pm   What time do you go to bed on a day off? (Give a range) Between 11:00pm or midnight   How long does it take you to fall asleep? (Give a range) Maybe 10-20 minutes   On average, how many times per night do you wake up? About 2 to 3 times   How long does it take you to fall back into sleep? (Give a range) About 5 minutes   What time do you wake up to start your day on a week day? (Give a range) 4:30 am   What time do you wake up to start your day on a day off? (Give a range) About 8 am   What time do you get out of bed? (Give a range) Depends on what I have to do that day. I'll get up as soon as I wake up but if I don't have anything planned I'll lay in bed maybe an hour or so.   On average, how many hours do you sleep? About 6 hrs   On average, how many naps do you take per day? Maybe one if I'm really tired.   Rate your sleep quality from 0 to 5 (0-poor, 5-great). 3   Have you experienced:  N/a   How much weight have you lost or gained (in lbs.) in the last year? 0   On average, how many times per night do you go to the bathroom?  1 time.   Have you ever had a sleep study/CPAP machine/surgery for sleep apnea? No   Have you ever had a CPAP machine for sleep apnea? No   Have you ever had surgery for sleep apnea? No       Sleep Clinic ROS  3/17/2020   Difficulty breathing through the nose?  Yes   Sore throat or dry mouth in the morning? No   Irregular or very fast heart beat?  Sometimes   Shortness of breath?   Sometimes   Acid reflux? Sometimes   Body aches and pains?  Sometimes   Morning headaches? Sometimes   Dizziness? Sometimes   Mood changes?  No   Do you exercise?  Sometimes   Do you feel like moving your legs a lot?  Sometimes            PREVIOUS SLEEP STUDIES:      HST 5/22/20: Significant Obstructive sleep apnea (DALTON) with AHI (apnea hypopnea Index) of 25;  RDI of 31and SaO2 of 78% (weight  272 lbs).          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    Family history of ischemic heart disease 07/11/2015    Type 2 diabetes mellitus without complication, without long-term current use of insulin 07/11/2015    Essential hypertension 01/30/2019    DALTON (obstructive sleep apnea)      Resolved Ambulatory Problems     Diagnosis Date Noted    Other abnormal glucose 12/26/2012    Viral URI 10/15/2014    Leukocytosis 07/11/2015     Past Medical History:   Diagnosis Date    Hypertension                 PAST SURGICAL HISTORY:    Past Surgical History:   Procedure Laterality Date    NO PAST SURGERIES           FAMILY HISTORY:                Family History   Problem Relation Age of Onset    Diabetes Mother     Cataracts Mother     Blindness Maternal Grandmother     Cataracts Maternal Grandmother     Glaucoma Maternal Grandmother        SOCIAL HISTORY:          Tobacco:   Social History     Tobacco Use   Smoking Status Never Smoker   Smokeless Tobacco Never Used       alcohol use:    Social History     Substance and Sexual Activity   Alcohol Use Yes     Comment: Very seldom beer or mixed drink.                   ALLERGIES:  Review of patient's allergies indicates:  No Known Allergies    CURRENT MEDICATIONS:    Current Outpatient Medications   Medication Sig Dispense Refill    amLODIPine (NORVASC) 10 MG tablet Take 1 tablet (10 mg total) by mouth once daily. 30 tablet 0    aspirin (ECOTRIN) 81 MG EC tablet Take 81 mg by mouth once daily.        fluticasone propionate (FLONASE) 50 mcg/actuation nasal spray SHAKE LIQUID AND USE 2 SPRAYS(100 MCG) IN EACH NOSTRIL EVERY DAY 16 g 0    hydrocodone-chlorpheniramine (TUSSIONEX) 10-8 mg/5 mL suspension Take 5 mLs by mouth nightly as needed for Cough or Congestion. (Patient not taking: Reported on 3/17/2020) 60 mL 0    loratadine (CLARITIN) 10 mg tablet Take 1 tablet (10 mg total) by mouth once daily. (Patient not taking: Reported on 3/17/2020) 60 tablet 0    metFORMIN (GLUCOPHAGE-XR) 500 MG XR 24hr tablet Take 1 tablet (500 mg total) by mouth daily with breakfast. (Patient not taking: Reported on 3/17/2020) 90 tablet 1    triamterene-hydrochlorothiazide 37.5-25 mg (DYAZIDE) 37.5-25 mg per capsule Take 1 capsule by mouth every morning. 90 capsule 1     No current facility-administered medications for this visit.                           PHYSICAL EXAM:  There were no vitals taken for this visit.  GENERAL: Normal development, well groomed.  HEENT:   HEENT:  Conjunctivae are non-erythematous; Pupils equal, round, and reactive to light; Nose is symmetrical; Nasal mucosa is pink and moist; Septum is midline; Inferior turbinates are normal; Nasal airflow is normal; Posterior pharynx is pink; Modified Mallampati:III-IV; Posterior palate is low; Tonsils not visualized; Uvula is elongated;Tongue is enlarged; Dentition is fair; No TMJ tenderness; Jaw opening and protrusion without click and without discomfort.  NECK: Supple. Neck circumference is 18 inches. No thyromegaly. No palpable nodes.     SKIN: On face and neck: No  "abrasions, no rashes, no lesions.  No subcutaneous nodules are palpable.  RESPIRATORY: Chest is clear to auscultation.  Normal chest expansion and non-labored breathing at rest.  CARDIOVASCULAR: Normal S1, S2.  No murmurs, gallops or rubs. No carotid bruits bilaterally.  No edema. No clubbing. No cyanosis.    NEURO: Oriented to time, place and person. Normal attention span and concentration. Gait normal.    PSYCH: Affect is full. Mood is normal  MUSCULOSKELETAL: Moves 4 extremities. Gait normal.         Using [facility]: yes      ASSESSMENT:    1. DALTON; moderate severe as per HST in 5/20.  The patient symptomatically has  excessive daytime sleepiness, snoring,  witnessed breathing pauses, gasping for air in sleep and interrupted sleep  with exam findings of "a crowded oral airway and elevated body mass index. The patient has medical co-morbidities of diabetes and hypertension,  which can be worsened by DALTON. This warrants treament        PLAN:        Treatment: prescription  for auto CPAP 5-20 cm with the mask of a patient's choice was given to the patient.    Education: During our discussion today, we talked about the etiology of obstructive sleep apnea as well as the potential ramifications of untreated sleep apnea, which could include daytime sleepiness, hypertension, heart disease and/or stroke.      We discussed potential treatment options, which could include weight loss, body positioning, continuous positive airway pressure (CPAP), or referral for surgical consideration. The patient preferred CPAP option.     Discussed purpose of PAP therapy, health benefits of CPAP, as well as the potential ramifications of untreated sleep apnea, which could include daytime sleepiness, hypertension, heart disease and/or stroke. An AHI of 15 is associated with increased risk CVD.     The patient should avoid ETOH and sedatives at night, as it tends to aggravate DALTON. Regular replacement of CPAP mask, tubing and filter was " recommended.    Precautions: The patient was advised to abstain from driving should he feel sleepy or drowsy.    Follow up: me after 1 month of PAP use.    Thank you for allowing me the opportunity to participate in the care of your patient.

## 2020-06-01 NOTE — PATIENT INSTRUCTIONS
I'm ordering  the machine for you through [facility]:  [phone]->1->2  They will call you within a week, or you can call them.  ________________________________    Aftter you get your machine:    1. Please keep in mind, that when you come to  the machine, you will be choosing the CPAP mask during that time.   Please call [phone]->1->2 within 30 days if you uncomfortable with your mask    2. Please let me know through [facility] if you are not comfortable with the pressure settings - I can help.    My medical assistant will call you to schedule the follow up.              Sincerely,    Dr. Carbajal

## 2020-06-04 ENCOUNTER — PATIENT OUTREACH (OUTPATIENT)
Dept: OTHER | Facility: OTHER | Age: 40
End: 2020-06-04

## 2020-06-24 NOTE — PROGRESS NOTES
"Digital Medicine: Health  Follow-Up    Called to follow up with patient, current BP average 127/80 is at goal <130/<80. Patient is doing well and reports no complaints/concerns at this time.     Patient is to have labs done to re-check his A1c, was told by his PCP that he was on the line of being diagnosed with diabetes at his last check. Patient believes that his A1c will be "about the same" this time stating that because he was first told before the COVID-19 pandemic and stay at home mandate he hasn't been able to make the lifestyle changes he was hoping to. Recommended based on a peer-reviewed study that the patient start walking 10-15 minutes after dinner each night, which in the study I referenced resulted in an A1c reduction of up to 1%. Patient stated he wasn't sure about walking outside given the heat/rain. Advised patient that he can walk indoors in his house for 10 minutes, the location is irrelevant to the benefits on blood sugar. Patient stated "I'll give it a try".     Had sleep study consultation, waiting on follow up after results were received. Patient was diagnosed with sleep apnea and is now waiting to hear about his c-pap machine. Patient reports the last contact on the c-pap he received was about 2 weeks ago. Patient is anxious to start using it.         The history is provided by the patient. No  was used.   Follow Up  Follow-up reason(s): reading review and routine education      Readings are missing.   patient reminder needed.  Routine Education Topics: physical activity        INTERVENTION(S)  recommend physical activity, encouragement/support, denied resources and denied questions    PLAN  patient verbalizes understanding, patient amenable to changes and continue monitoring    Recommended patient start walking daily after dinner for 10 minutes to help with blood sugar and blood pressure control, patient agreed and is amenable to this change.   Patient denied any " questions/concerns for her digital medicine care team at this time.  Patient will continue to wait for an update on the status of his c-pap machine for treatment of his sleep apnea.   Patient agreed to continue to take regular readings, [facility] care team will continue to monitor and I will follow up in 6 weeks.       There are no preventive care reminders to display for this patient.    Last 5 Patient Entered Readings                                      Current 30 Day Average: 127/80     Recent Readings 6/9/2020 5/12/2020 4/29/2020 4/15/2020 3/20/2020    SBP (mmHg) 127 123 123 115 120    DBP (mmHg) 80 73 80 74 77    Pulse 82 85 88 85 95                  Screenings    SDOH

## 2020-06-27 ENCOUNTER — LAB VISIT (OUTPATIENT)
Dept: LAB | Facility: HOSPITAL | Age: 40
End: 2020-06-27
Attending: NURSE PRACTITIONER
Payer: COMMERCIAL

## 2020-06-27 DIAGNOSIS — E11.9 TYPE 2 DIABETES MELLITUS WITHOUT COMPLICATION, WITHOUT LONG-TERM CURRENT USE OF INSULIN: ICD-10-CM

## 2020-06-27 LAB
ANION GAP SERPL CALC-SCNC: 9 MMOL/L (ref 8–16)
BUN SERPL-MCNC: 15 MG/DL (ref 6–20)
CALCIUM SERPL-MCNC: 9.1 MG/DL (ref 8.7–10.5)
CHLORIDE SERPL-SCNC: 103 MMOL/L (ref 95–110)
CO2 SERPL-SCNC: 28 MMOL/L (ref 23–29)
CREAT SERPL-MCNC: 1.2 MG/DL (ref 0.5–1.4)
EST. GFR  (AFRICAN AMERICAN): >60 ML/MIN/1.73 M^2
EST. GFR  (NON AFRICAN AMERICAN): >60 ML/MIN/1.73 M^2
ESTIMATED AVG GLUCOSE: 120 MG/DL (ref 68–131)
GLUCOSE SERPL-MCNC: 95 MG/DL (ref 70–110)
HBA1C MFR BLD HPLC: 5.8 % (ref 4–5.6)
POTASSIUM SERPL-SCNC: 3.8 MMOL/L (ref 3.5–5.1)
SODIUM SERPL-SCNC: 140 MMOL/L (ref 136–145)

## 2020-06-27 PROCEDURE — 36415 COLL VENOUS BLD VENIPUNCTURE: CPT | Mod: PO

## 2020-06-27 PROCEDURE — 80048 BASIC METABOLIC PNL TOTAL CA: CPT

## 2020-06-27 PROCEDURE — 83036 HEMOGLOBIN GLYCOSYLATED A1C: CPT

## 2020-06-30 ENCOUNTER — TELEPHONE (OUTPATIENT)
Dept: FAMILY MEDICINE | Facility: CLINIC | Age: 40
End: 2020-06-30

## 2020-06-30 DIAGNOSIS — I10 ESSENTIAL HYPERTENSION: ICD-10-CM

## 2020-06-30 DIAGNOSIS — E11.9 TYPE 2 DIABETES MELLITUS WITHOUT COMPLICATION, WITHOUT LONG-TERM CURRENT USE OF INSULIN: ICD-10-CM

## 2020-06-30 DIAGNOSIS — E78.5 DYSLIPIDEMIA WITH ELEVATED LOW DENSITY LIPOPROTEIN CHOLESTEROL AND ABNORMALLY LOW HIGH DENSITY LIPOPROTEIN CHOLESTEROL: Primary | ICD-10-CM

## 2020-08-20 ENCOUNTER — PATIENT OUTREACH (OUTPATIENT)
Dept: OTHER | Facility: OTHER | Age: 40
End: 2020-08-20

## 2020-09-01 ENCOUNTER — PATIENT OUTREACH (OUTPATIENT)
Dept: ADMINISTRATIVE | Facility: OTHER | Age: 40
End: 2020-09-01

## 2020-09-01 ENCOUNTER — OFFICE VISIT (OUTPATIENT)
Dept: SLEEP MEDICINE | Facility: CLINIC | Age: 40
End: 2020-09-01
Payer: COMMERCIAL

## 2020-09-01 DIAGNOSIS — G47.33 OSA (OBSTRUCTIVE SLEEP APNEA): Primary | ICD-10-CM

## 2020-09-01 PROCEDURE — 99499 NO LOS: ICD-10-PCS | Mod: 95,,, | Performed by: PSYCHIATRY & NEUROLOGY

## 2020-09-01 PROCEDURE — 99499 UNLISTED E&M SERVICE: CPT | Mod: 95,,, | Performed by: PSYCHIATRY & NEUROLOGY

## 2020-09-01 NOTE — Clinical Note
Manuel,    Please reschedule and cancel the charge for the visit - the patient was supposed to see me for CPAp follow up, however he has not received his CPAP machine yet.     Thank you!

## 2020-09-01 NOTE — PROGRESS NOTES
Health Maintenance Due   Topic Date Due    Hepatitis C Screening  1980    Influenza Vaccine (1) 09/01/2020     Updates were requested from care everywhere.  Chart was reviewed for overdue Proactive [facility] Encounters ([facility]) topics (CRS, Breast Cancer Screening, Eye exam)  Health Maintenance has been updated.  [facility] immunization registry triggered.  Immunizations were reconciled.

## 2020-09-01 NOTE — PROGRESS NOTES
Please reschedule and cancel the charge for the visit - the patient was supposed to see me for CPAp follow up, however he has not received his CPAP machine yet.

## 2020-09-25 NOTE — PROGRESS NOTES
"Digital Medicine: Health  Follow-Up    Called to follow up with patient about lack of readings, last BP reading is from 7/14. Patient stated he "just forgot" and will resume readings "today after work". Advised patient to charge his device after work and resume readings tomorrow to avoid any strange readings as a results of low battery on his cuff. Patient verbalized understanding and agreed to this. Patient otherwise stated that he was doing well and he denied health coaching, goal setting, and resources at this time.         The history is provided by the patient.                 Patient needs assistance troubleshooting: patient reminder needed.          Diet-Not assessed          Physical Activity-Not assessed    Medication Adherence-Medication adherence was assessed.      Substance, Sleep, Stress-Not assessed      Additional monitoring needed. Agreed to resume readings ASAP  Continue current diet/physical activity routine. Denied making any changes at this time  Instructed to charge device.       Addressed patient questions and patient has my contact information if needed prior to next outreach. Patient verbalizes understanding.      Explained the importance of self-monitoring and medication adherence. Encouraged the patient to communicate with their health  for lifestyle modifications to help improve or maintain a healthy lifestyle.            There are no preventive care reminders to display for this patient.    Last 5 Patient Entered Readings                                      Current 30 Day Average:      Recent Readings 7/14/2020 6/26/2020 6/9/2020 5/12/2020 4/29/2020    SBP (mmHg) 134 125 127 123 123    DBP (mmHg) 89 73 80 73 80    Pulse 80 87 82 85 88               "

## 2020-10-26 ENCOUNTER — PATIENT OUTREACH (OUTPATIENT)
Dept: OTHER | Facility: OTHER | Age: 40
End: 2020-10-26

## 2020-11-27 NOTE — PROGRESS NOTES
"Digital Medicine: Health  Follow-Up    Called to follow up with patient on lack of readings, per last outreach patient had stated he had "forgotten" to take readings. Patient affirmed that has continued to be the case today. Encouraged patient to resume readings ASAP and educated him on the importance of self monitoring. Patient agreed to resume readings "tonight". Patient was currently "in the back yard" chopping down a tree, conversation was kept short due to this circumstance.    The history is provided by the patient.                 Patient needs assistance troubleshooting: patient reminder needed.      Topics Covered on Call: device use            Diet-Not assessed          Physical Activity-Not assessed    Medication Adherence-Medication Adherence not addressed.      Substance, Sleep, Stress-Not assessed      Additional monitoring needed.  Continue current diet/physical activity routine.  Instructed to charge device.  Provided patient education.  Reviewed Device Techniques.     Addressed patient questions and patient has my contact information if needed prior to next outreach. Patient verbalizes understanding.      Explained the importance of self-monitoring and medication adherence. Encouraged the patient to communicate with their health  for lifestyle modifications to help improve or maintain a healthy lifestyle.               There are no preventive care reminders to display for this patient.      Last 5 Patient Entered Readings                                      Current 30 Day Average:      Recent Readings 7/14/2020 6/26/2020 6/9/2020 5/12/2020 4/29/2020    SBP (mmHg) 134 125 127 123 123    DBP (mmHg) 89 73 80 73 80    Pulse 80 87 82 85 88               "

## 2020-12-16 ENCOUNTER — PATIENT OUTREACH (OUTPATIENT)
Dept: OTHER | Facility: OTHER | Age: 40
End: 2020-12-16

## 2021-03-17 ENCOUNTER — TELEPHONE (OUTPATIENT)
Dept: WOUND CARE | Facility: HOSPITAL | Age: 41
End: 2021-03-17

## 2021-04-16 ENCOUNTER — PATIENT MESSAGE (OUTPATIENT)
Dept: RESEARCH | Facility: HOSPITAL | Age: 41
End: 2021-04-16

## 2024-05-09 ENCOUNTER — TELEPHONE (OUTPATIENT)
Dept: WOUND CARE | Facility: HOSPITAL | Age: 44
End: 2024-05-09
Payer: COMMERCIAL

## 2024-05-09 NOTE — TELEPHONE ENCOUNTER
----- Message from Trent Strauss LPN sent at 5/8/2024  5:38 PM CDT -----  Regarding: FW: Discharge from Digital Medicine    ----- Message -----  From: Mahesh Parra  Sent: 5/8/2024   4:59 PM CDT  To: [facility]  Subject: Discharge from Digital Medicine                  Dr. Bettina Castro /     Unfortunately, Mr. Dewey Melendez has failed to meet basic participation requirements for the [facility] Digital [facility] Program and will be discharged from the program. We have tried to contact him on multiple occasions without success. Unfortunately, He is not a good candidate for digital medicine monitoring.     Thank you for your support of Digital Medicine! Please contact me if you have any questions or concerns.    Sincerely,  Mahesh Parra